# Patient Record
Sex: FEMALE | Race: BLACK OR AFRICAN AMERICAN | NOT HISPANIC OR LATINO | Employment: UNEMPLOYED | URBAN - METROPOLITAN AREA
[De-identification: names, ages, dates, MRNs, and addresses within clinical notes are randomized per-mention and may not be internally consistent; named-entity substitution may affect disease eponyms.]

---

## 2020-07-20 DIAGNOSIS — Z32.00 VISIT FOR CONFIRMATION OF PREGNANCY TEST RESULT WITH PHYSICAL EXAM: Primary | ICD-10-CM

## 2020-07-20 LAB — PROGEST SERPL-MCNC: 60.6 NG/ML

## 2020-07-20 PROCEDURE — 84702 CHORIONIC GONADOTROPIN TEST: CPT

## 2020-07-20 PROCEDURE — 84144 ASSAY OF PROGESTERONE: CPT

## 2020-07-20 PROCEDURE — 36415 COLL VENOUS BLD VENIPUNCTURE: CPT

## 2020-07-21 LAB — B-HCG SERPL-ACNC: 250 IU/L (ref 0–5)

## 2020-07-22 ENCOUNTER — MEDICAL CORRESPONDENCE (OUTPATIENT)
Dept: HEALTH INFORMATION MANAGEMENT | Facility: CLINIC | Age: 35
End: 2020-07-22

## 2020-07-22 DIAGNOSIS — Z32.00 POSSIBLE PREGNANCY, NOT YET CONFIRMED: ICD-10-CM

## 2020-07-22 DIAGNOSIS — Z32.00 POSSIBLE PREGNANCY, NOT YET CONFIRMED: Primary | ICD-10-CM

## 2020-07-22 LAB
B-HCG SERPL-ACNC: 349 IU/L (ref 0–5)
PROGEST SERPL-MCNC: 45.5 NG/ML

## 2020-07-22 PROCEDURE — 84702 CHORIONIC GONADOTROPIN TEST: CPT | Performed by: OBSTETRICS & GYNECOLOGY

## 2020-07-22 PROCEDURE — 84144 ASSAY OF PROGESTERONE: CPT | Performed by: OBSTETRICS & GYNECOLOGY

## 2020-07-22 PROCEDURE — 36415 COLL VENOUS BLD VENIPUNCTURE: CPT | Performed by: OBSTETRICS & GYNECOLOGY

## 2020-07-24 DIAGNOSIS — Z32.00 POSSIBLE PREGNANCY, NOT YET CONFIRMED: Primary | ICD-10-CM

## 2020-07-27 ENCOUNTER — TELEPHONE (OUTPATIENT)
Dept: OBGYN | Facility: CLINIC | Age: 35
End: 2020-07-27

## 2020-07-27 ENCOUNTER — MEDICAL CORRESPONDENCE (OUTPATIENT)
Dept: HEALTH INFORMATION MANAGEMENT | Facility: CLINIC | Age: 35
End: 2020-07-27

## 2020-07-27 DIAGNOSIS — Z32.00 POSSIBLE PREGNANCY, NOT YET CONFIRMED: ICD-10-CM

## 2020-07-27 LAB — B-HCG SERPL-ACNC: 700 IU/L (ref 0–5)

## 2020-07-27 PROCEDURE — 84702 CHORIONIC GONADOTROPIN TEST: CPT | Performed by: OBSTETRICS & GYNECOLOGY

## 2020-07-27 PROCEDURE — 36415 COLL VENOUS BLD VENIPUNCTURE: CPT | Performed by: OBSTETRICS & GYNECOLOGY

## 2020-07-27 NOTE — TELEPHONE ENCOUNTER
[facility] in [place] called regarding this patients lab results from her appointment today.   Augusta would like a call when the results are faxed over at [phone]

## 2020-07-27 NOTE — TELEPHONE ENCOUNTER
Today's HCG was faxed as requested  Pt informed  Darleen Betancourt RN on 7/27/2020 at 4:29 PM

## 2020-07-29 ENCOUNTER — HOSPITAL ENCOUNTER (OUTPATIENT)
Facility: CLINIC | Age: 35
Discharge: HOME | End: 2020-07-30
Attending: EMERGENCY MEDICINE | Admitting: OBSTETRICS & GYNECOLOGY
Payer: MEDICAID

## 2020-07-29 ENCOUNTER — ANESTHESIA EVENT (OUTPATIENT)
Dept: SURGERY | Facility: CLINIC | Age: 35
End: 2020-07-29
Payer: MEDICAID

## 2020-07-29 ENCOUNTER — ANESTHESIA (OUTPATIENT)
Dept: SURGERY | Facility: CLINIC | Age: 35
End: 2020-07-29
Payer: MEDICAID

## 2020-07-29 ENCOUNTER — APPOINTMENT (OUTPATIENT)
Dept: ULTRASOUND IMAGING | Facility: CLINIC | Age: 35
End: 2020-07-29
Attending: EMERGENCY MEDICINE
Payer: MEDICAID

## 2020-07-29 DIAGNOSIS — O00.90 ECTOPIC PREGNANCY: ICD-10-CM

## 2020-07-29 DIAGNOSIS — O00.101 RIGHT TUBAL PREGNANCY WITHOUT INTRAUTERINE PREGNANCY: Primary | ICD-10-CM

## 2020-07-29 DIAGNOSIS — Z32.00 POSSIBLE PREGNANCY, NOT YET CONFIRMED: ICD-10-CM

## 2020-07-29 LAB
ABO + RH BLD: NORMAL
ABO + RH BLD: NORMAL
ALBUMIN UR-MCNC: 10 MG/DL
ANION GAP SERPL CALCULATED.3IONS-SCNC: 5 MMOL/L (ref 3–14)
APPEARANCE UR: CLEAR
B-HCG SERPL-ACNC: 876 IU/L (ref 0–5)
B-HCG SERPL-ACNC: 899 IU/L (ref 0–5)
BASOPHILS # BLD AUTO: 0 10E9/L (ref 0–0.2)
BASOPHILS NFR BLD AUTO: 0.4 %
BILIRUB UR QL STRIP: NEGATIVE
BLD GP AB SCN SERPL QL: NORMAL
BLOOD BANK CMNT PATIENT-IMP: NORMAL
BUN SERPL-MCNC: 12 MG/DL (ref 7–30)
CALCIUM SERPL-MCNC: 8.3 MG/DL (ref 8.5–10.1)
CHLORIDE SERPL-SCNC: 105 MMOL/L (ref 94–109)
CO2 SERPL-SCNC: 26 MMOL/L (ref 20–32)
COLOR UR AUTO: YELLOW
CREAT SERPL-MCNC: 0.88 MG/DL (ref 0.52–1.04)
DIFFERENTIAL METHOD BLD: ABNORMAL
EOSINOPHIL # BLD AUTO: 0.5 10E9/L (ref 0–0.7)
EOSINOPHIL NFR BLD AUTO: 4.9 %
ERYTHROCYTE [DISTWIDTH] IN BLOOD BY AUTOMATED COUNT: 13.1 % (ref 10–15)
GFR SERPL CREATININE-BSD FRML MDRD: 85 ML/MIN/{1.73_M2}
GLUCOSE SERPL-MCNC: 101 MG/DL (ref 70–99)
GLUCOSE UR STRIP-MCNC: NEGATIVE MG/DL
HCT VFR BLD AUTO: 34.1 % (ref 35–47)
HGB BLD-MCNC: 11.4 G/DL (ref 11.7–15.7)
HGB UR QL STRIP: ABNORMAL
IMM GRANULOCYTES # BLD: 0 10E9/L (ref 0–0.4)
IMM GRANULOCYTES NFR BLD: 0.2 %
KETONES UR STRIP-MCNC: NEGATIVE MG/DL
LEUKOCYTE ESTERASE UR QL STRIP: ABNORMAL
LYMPHOCYTES # BLD AUTO: 3.6 10E9/L (ref 0.8–5.3)
LYMPHOCYTES NFR BLD AUTO: 35.7 %
MCH RBC QN AUTO: 28.1 PG (ref 26.5–33)
MCHC RBC AUTO-ENTMCNC: 33.4 G/DL (ref 31.5–36.5)
MCV RBC AUTO: 84 FL (ref 78–100)
MONOCYTES # BLD AUTO: 0.9 10E9/L (ref 0–1.3)
MONOCYTES NFR BLD AUTO: 8.8 %
MUCOUS THREADS #/AREA URNS LPF: PRESENT /LPF
NEUTROPHILS # BLD AUTO: 5.1 10E9/L (ref 1.6–8.3)
NEUTROPHILS NFR BLD AUTO: 50 %
NITRATE UR QL: NEGATIVE
NRBC # BLD AUTO: 0 10*3/UL
NRBC BLD AUTO-RTO: 0 /100
PH UR STRIP: 5.5 PH (ref 5–7)
PLATELET # BLD AUTO: 458 10E9/L (ref 150–450)
POTASSIUM SERPL-SCNC: 3.4 MMOL/L (ref 3.4–5.3)
RADIOLOGIST FLAGS: ABNORMAL
RBC # BLD AUTO: 4.06 10E12/L (ref 3.8–5.2)
RBC #/AREA URNS AUTO: 24 /HPF (ref 0–2)
SODIUM SERPL-SCNC: 136 MMOL/L (ref 133–144)
SOURCE: ABNORMAL
SP GR UR STRIP: 1.02 (ref 1–1.03)
SPECIMEN EXP DATE BLD: NORMAL
SQUAMOUS #/AREA URNS AUTO: 2 /HPF (ref 0–1)
UROBILINOGEN UR STRIP-MCNC: NORMAL MG/DL (ref 0–2)
WBC # BLD AUTO: 10.1 10E9/L (ref 4–11)
WBC #/AREA URNS AUTO: 8 /HPF (ref 0–5)

## 2020-07-29 PROCEDURE — 25000125 ZZHC RX 250: Performed by: NURSE ANESTHETIST, CERTIFIED REGISTERED

## 2020-07-29 PROCEDURE — 96360 HYDRATION IV INFUSION INIT: CPT

## 2020-07-29 PROCEDURE — 86901 BLOOD TYPING SEROLOGIC RH(D): CPT | Performed by: EMERGENCY MEDICINE

## 2020-07-29 PROCEDURE — 88305 TISSUE EXAM BY PATHOLOGIST: CPT | Performed by: OBSTETRICS & GYNECOLOGY

## 2020-07-29 PROCEDURE — 99285 EMERGENCY DEPT VISIT HI MDM: CPT | Mod: 25

## 2020-07-29 PROCEDURE — 85025 COMPLETE CBC W/AUTO DIFF WBC: CPT | Performed by: EMERGENCY MEDICINE

## 2020-07-29 PROCEDURE — 71000012 ZZH RECOVERY PHASE 1 LEVEL 1 FIRST HR: Performed by: OBSTETRICS & GYNECOLOGY

## 2020-07-29 PROCEDURE — 27210794 ZZH OR GENERAL SUPPLY STERILE: Performed by: OBSTETRICS & GYNECOLOGY

## 2020-07-29 PROCEDURE — 81001 URINALYSIS AUTO W/SCOPE: CPT | Performed by: EMERGENCY MEDICINE

## 2020-07-29 PROCEDURE — 76801 OB US < 14 WKS SINGLE FETUS: CPT

## 2020-07-29 PROCEDURE — 25000125 ZZHC RX 250: Performed by: OBSTETRICS & GYNECOLOGY

## 2020-07-29 PROCEDURE — 88305 TISSUE EXAM BY PATHOLOGIST: CPT | Mod: 26 | Performed by: OBSTETRICS & GYNECOLOGY

## 2020-07-29 PROCEDURE — 25800030 ZZH RX IP 258 OP 636: Performed by: ANESTHESIOLOGY

## 2020-07-29 PROCEDURE — 40000170 ZZH STATISTIC PRE-PROCEDURE ASSESSMENT II: Performed by: OBSTETRICS & GYNECOLOGY

## 2020-07-29 PROCEDURE — 37000008 ZZH ANESTHESIA TECHNICAL FEE, 1ST 30 MIN: Performed by: OBSTETRICS & GYNECOLOGY

## 2020-07-29 PROCEDURE — 25000566 ZZH SEVOFLURANE, EA 15 MIN: Performed by: OBSTETRICS & GYNECOLOGY

## 2020-07-29 PROCEDURE — 25800030 ZZH RX IP 258 OP 636: Performed by: NURSE ANESTHETIST, CERTIFIED REGISTERED

## 2020-07-29 PROCEDURE — 86900 BLOOD TYPING SEROLOGIC ABO: CPT | Performed by: EMERGENCY MEDICINE

## 2020-07-29 PROCEDURE — 36000058 ZZH SURGERY LEVEL 3 EA 15 ADDTL MIN: Performed by: OBSTETRICS & GYNECOLOGY

## 2020-07-29 PROCEDURE — 25800025 ZZH RX 258: Performed by: OBSTETRICS & GYNECOLOGY

## 2020-07-29 PROCEDURE — 25000128 H RX IP 250 OP 636: Performed by: OBSTETRICS & GYNECOLOGY

## 2020-07-29 PROCEDURE — 36415 COLL VENOUS BLD VENIPUNCTURE: CPT | Performed by: OBSTETRICS & GYNECOLOGY

## 2020-07-29 PROCEDURE — 86850 RBC ANTIBODY SCREEN: CPT | Performed by: EMERGENCY MEDICINE

## 2020-07-29 PROCEDURE — 84702 CHORIONIC GONADOTROPIN TEST: CPT | Performed by: OBSTETRICS & GYNECOLOGY

## 2020-07-29 PROCEDURE — 25000128 H RX IP 250 OP 636: Performed by: NURSE ANESTHETIST, CERTIFIED REGISTERED

## 2020-07-29 PROCEDURE — 80048 BASIC METABOLIC PNL TOTAL CA: CPT | Performed by: EMERGENCY MEDICINE

## 2020-07-29 PROCEDURE — 84702 CHORIONIC GONADOTROPIN TEST: CPT | Performed by: EMERGENCY MEDICINE

## 2020-07-29 PROCEDURE — 25800030 ZZH RX IP 258 OP 636: Performed by: EMERGENCY MEDICINE

## 2020-07-29 PROCEDURE — 76815 OB US LIMITED FETUS(S): CPT

## 2020-07-29 PROCEDURE — 37000009 ZZH ANESTHESIA TECHNICAL FEE, EACH ADDTL 15 MIN: Performed by: OBSTETRICS & GYNECOLOGY

## 2020-07-29 PROCEDURE — 36000056 ZZH SURGERY LEVEL 3 1ST 30 MIN: Performed by: OBSTETRICS & GYNECOLOGY

## 2020-07-29 RX ORDER — GLYCOPYRROLATE 0.2 MG/ML
INJECTION, SOLUTION INTRAMUSCULAR; INTRAVENOUS PRN
Status: DISCONTINUED | OUTPATIENT
Start: 2020-07-29 | End: 2020-07-30

## 2020-07-29 RX ORDER — PROPOFOL 10 MG/ML
INJECTION, EMULSION INTRAVENOUS PRN
Status: DISCONTINUED | OUTPATIENT
Start: 2020-07-29 | End: 2020-07-30

## 2020-07-29 RX ORDER — ONDANSETRON 2 MG/ML
4 INJECTION INTRAMUSCULAR; INTRAVENOUS EVERY 30 MIN PRN
Status: DISCONTINUED | OUTPATIENT
Start: 2020-07-29 | End: 2020-07-30 | Stop reason: HOSPADM

## 2020-07-29 RX ORDER — HYDROMORPHONE HYDROCHLORIDE 1 MG/ML
.3-.5 INJECTION, SOLUTION INTRAMUSCULAR; INTRAVENOUS; SUBCUTANEOUS EVERY 10 MIN PRN
Status: DISCONTINUED | OUTPATIENT
Start: 2020-07-29 | End: 2020-07-30

## 2020-07-29 RX ORDER — ACETAMINOPHEN 650 MG/1
650 SUPPOSITORY RECTAL EVERY 4 HOURS PRN
Status: DISCONTINUED | OUTPATIENT
Start: 2020-07-29 | End: 2020-07-30 | Stop reason: HOSPADM

## 2020-07-29 RX ORDER — NEOSTIGMINE METHYLSULFATE 1 MG/ML
VIAL (ML) INJECTION PRN
Status: DISCONTINUED | OUTPATIENT
Start: 2020-07-29 | End: 2020-07-30

## 2020-07-29 RX ORDER — NALOXONE HYDROCHLORIDE 0.4 MG/ML
.1-.4 INJECTION, SOLUTION INTRAMUSCULAR; INTRAVENOUS; SUBCUTANEOUS
Status: DISCONTINUED | OUTPATIENT
Start: 2020-07-29 | End: 2020-07-30 | Stop reason: HOSPADM

## 2020-07-29 RX ORDER — FENTANYL CITRATE 50 UG/ML
25-50 INJECTION, SOLUTION INTRAMUSCULAR; INTRAVENOUS
Status: DISCONTINUED | OUTPATIENT
Start: 2020-07-29 | End: 2020-07-30 | Stop reason: HOSPADM

## 2020-07-29 RX ORDER — MAGNESIUM HYDROXIDE 1200 MG/15ML
LIQUID ORAL PRN
Status: DISCONTINUED | OUTPATIENT
Start: 2020-07-29 | End: 2020-07-30 | Stop reason: HOSPADM

## 2020-07-29 RX ORDER — DEXAMETHASONE SODIUM PHOSPHATE 4 MG/ML
INJECTION, SOLUTION INTRA-ARTICULAR; INTRALESIONAL; INTRAMUSCULAR; INTRAVENOUS; SOFT TISSUE PRN
Status: DISCONTINUED | OUTPATIENT
Start: 2020-07-29 | End: 2020-07-30

## 2020-07-29 RX ORDER — FENTANYL CITRATE 50 UG/ML
INJECTION, SOLUTION INTRAMUSCULAR; INTRAVENOUS PRN
Status: DISCONTINUED | OUTPATIENT
Start: 2020-07-29 | End: 2020-07-30

## 2020-07-29 RX ORDER — ONDANSETRON 2 MG/ML
INJECTION INTRAMUSCULAR; INTRAVENOUS PRN
Status: DISCONTINUED | OUTPATIENT
Start: 2020-07-29 | End: 2020-07-30

## 2020-07-29 RX ORDER — SODIUM CHLORIDE, SODIUM LACTATE, POTASSIUM CHLORIDE, CALCIUM CHLORIDE 600; 310; 30; 20 MG/100ML; MG/100ML; MG/100ML; MG/100ML
INJECTION, SOLUTION INTRAVENOUS CONTINUOUS
Status: DISCONTINUED | OUTPATIENT
Start: 2020-07-30 | End: 2020-07-30 | Stop reason: HOSPADM

## 2020-07-29 RX ORDER — LIDOCAINE HYDROCHLORIDE 20 MG/ML
INJECTION, SOLUTION INFILTRATION; PERINEURAL PRN
Status: DISCONTINUED | OUTPATIENT
Start: 2020-07-29 | End: 2020-07-30

## 2020-07-29 RX ORDER — SODIUM CHLORIDE, SODIUM LACTATE, POTASSIUM CHLORIDE, CALCIUM CHLORIDE 600; 310; 30; 20 MG/100ML; MG/100ML; MG/100ML; MG/100ML
INJECTION, SOLUTION INTRAVENOUS CONTINUOUS
Status: DISCONTINUED | OUTPATIENT
Start: 2020-07-29 | End: 2020-07-30 | Stop reason: HOSPADM

## 2020-07-29 RX ORDER — KETOROLAC TROMETHAMINE 30 MG/ML
INJECTION, SOLUTION INTRAMUSCULAR; INTRAVENOUS PRN
Status: DISCONTINUED | OUTPATIENT
Start: 2020-07-29 | End: 2020-07-30

## 2020-07-29 RX ORDER — ONDANSETRON 4 MG/1
4 TABLET, ORALLY DISINTEGRATING ORAL EVERY 30 MIN PRN
Status: DISCONTINUED | OUTPATIENT
Start: 2020-07-29 | End: 2020-07-30 | Stop reason: HOSPADM

## 2020-07-29 RX ORDER — FENTANYL CITRATE 50 UG/ML
25-100 INJECTION, SOLUTION INTRAMUSCULAR; INTRAVENOUS
Status: DISCONTINUED | OUTPATIENT
Start: 2020-07-29 | End: 2020-07-30 | Stop reason: HOSPADM

## 2020-07-29 RX ORDER — BUPIVACAINE HYDROCHLORIDE AND EPINEPHRINE 2.5; 5 MG/ML; UG/ML
INJECTION, SOLUTION INFILTRATION; PERINEURAL PRN
Status: DISCONTINUED | OUTPATIENT
Start: 2020-07-29 | End: 2020-07-30 | Stop reason: HOSPADM

## 2020-07-29 RX ORDER — CEFAZOLIN SODIUM 2 G/100ML
2 INJECTION, SOLUTION INTRAVENOUS ONCE
Status: COMPLETED | OUTPATIENT
Start: 2020-07-29 | End: 2020-07-29

## 2020-07-29 RX ADMIN — NEOSTIGMINE METHYLSULFATE 3 MG: 1 INJECTION, SOLUTION INTRAVENOUS at 23:34

## 2020-07-29 RX ADMIN — CEFAZOLIN SODIUM 2 G: 2 INJECTION, SOLUTION INTRAVENOUS at 22:20

## 2020-07-29 RX ADMIN — ROCURONIUM BROMIDE 20 MG: 10 INJECTION INTRAVENOUS at 22:31

## 2020-07-29 RX ADMIN — FENTANYL CITRATE 50 MCG: 50 INJECTION, SOLUTION INTRAMUSCULAR; INTRAVENOUS at 22:12

## 2020-07-29 RX ADMIN — FENTANYL CITRATE 50 MCG: 50 INJECTION, SOLUTION INTRAMUSCULAR; INTRAVENOUS at 22:32

## 2020-07-29 RX ADMIN — HYDROMORPHONE HYDROCHLORIDE 0.5 MG: 1 INJECTION, SOLUTION INTRAMUSCULAR; INTRAVENOUS; SUBCUTANEOUS at 23:14

## 2020-07-29 RX ADMIN — SODIUM CHLORIDE 1000 ML: 9 INJECTION, SOLUTION INTRAVENOUS at 18:41

## 2020-07-29 RX ADMIN — GLYCOPYRROLATE 0.4 MG: 0.2 INJECTION, SOLUTION INTRAMUSCULAR; INTRAVENOUS at 23:34

## 2020-07-29 RX ADMIN — MIDAZOLAM 2 MG: 1 INJECTION INTRAMUSCULAR; INTRAVENOUS at 22:08

## 2020-07-29 RX ADMIN — PHENYLEPHRINE HYDROCHLORIDE 100 MCG: 10 INJECTION INTRAVENOUS at 22:28

## 2020-07-29 RX ADMIN — LIDOCAINE HYDROCHLORIDE 60 MG: 20 INJECTION, SOLUTION INFILTRATION; PERINEURAL at 22:12

## 2020-07-29 RX ADMIN — PROPOFOL 150 MG: 10 INJECTION, EMULSION INTRAVENOUS at 22:12

## 2020-07-29 RX ADMIN — SODIUM CHLORIDE, POTASSIUM CHLORIDE, SODIUM LACTATE AND CALCIUM CHLORIDE: 600; 310; 30; 20 INJECTION, SOLUTION INTRAVENOUS at 22:05

## 2020-07-29 RX ADMIN — ONDANSETRON 4 MG: 2 INJECTION INTRAMUSCULAR; INTRAVENOUS at 22:21

## 2020-07-29 RX ADMIN — SUCCINYLCHOLINE CHLORIDE 60 MG: 20 INJECTION, SOLUTION INTRAMUSCULAR; INTRAVENOUS; PARENTERAL at 22:12

## 2020-07-29 RX ADMIN — KETOROLAC TROMETHAMINE 30 MG: 30 INJECTION, SOLUTION INTRAMUSCULAR at 23:34

## 2020-07-29 RX ADMIN — DEXAMETHASONE SODIUM PHOSPHATE 4 MG: 4 INJECTION, SOLUTION INTRA-ARTICULAR; INTRALESIONAL; INTRAMUSCULAR; INTRAVENOUS; SOFT TISSUE at 22:21

## 2020-07-29 ASSESSMENT — ENCOUNTER SYMPTOMS
BACK PAIN: 0
FREQUENCY: 0
VOMITING: 0
ABDOMINAL PAIN: 1

## 2020-07-29 NOTE — ED PROVIDER NOTES
History     Chief Complaint:  Abdominal Pain      HPI   Sherry George is a 34 year  female with an IVF implant procedure on  who presents with abdominal pain. The patient reports that she is having lower abdominal pain that is near the belly button and also has vaginal pain that started on Saturday and is described as crampy. She notes that she has had one day of heavy spotting. She currently takes prenatal vitamins, but recently discontinues her Estradial last week. She reports that her HCG continues to rise after discontinuing her progesterone shots last week. The last time her HCG was checked her HCG level was 700. Her last ultrasound was on 9 days ago and a pregnancy was not seen. She denies frequency, vomiting, or back pain. Of note, she recently moved from [place] and has been in contact with her obstetrician there, but has not met her new obstetrician.    Allergies:  No Known Drug Allergies    Medications:    Prenatal vitamins    Past Medical History:    Medical history reviewed. No pertinent medical history.    Past Surgical History:    Surgical history reviewed. No pertinent surgical history.    Family History:    Family history reviewed. No pertinent family history.     Social History:  The patient was accompanied to the ED by her mother.  Smoking Status: Never Smoker  Smokeless Tobacco: Never Used  Alcohol Use: Not currently  Drug Use: Negative  PCP: Jose De Jesus [facility]  Marital Status:        Review of Systems   Gastrointestinal: Positive for abdominal pain. Negative for vomiting.   Genitourinary: Positive for vaginal bleeding and vaginal pain. Negative for frequency.   Musculoskeletal: Negative for back pain.   All other systems reviewed and are negative.      Physical Exam     Patient Vitals for the past 24 hrs:   BP Temp Temp src Pulse Resp SpO2   20 2053 112/75 -- -- 92 -- 100 %   20 1811 130/84 97.6  F (36.4  C) Temporal 95 18 100 %      Physical Exam  Gen: Pleasant, masked, accompanied by mother.    Eye:   Pupils are equal, round, and reactive.     Sclera non-injected.    Cardiac:     Normal rate and regular rhythm.    No murmurs, gallops, or rubs.    Pulmonary:     Clear to auscultation bilaterally.    No wheezes, rales, or rhonchi.    Abdomen:     Abdomen distended and diffusely tender to palpation.  Voluntary guarding throughout.    Musculoskeletal:     Normal movement of all extremities without evidence for deficit.    Extremities:    No edema.    Skin:   Warm and dry.    Neurologic:    Non-focal exam without asymmetric weakness or numbness.    Normal tone    Psychiatric:     Normal affect with appropriate interaction with examiner.    Emergency Department Course   Imaging:  Radiology findings were communicated with the patient who voiced understanding of the findings.     US OB <14 Weeks W Transvaginal  Abnormal  1. No intrauterine pregnancy is identified.  2. An ill-defined heterogeneous masslike structure in the right  adnexal region measures 5.6 cm, and should be considered highly  suspicious for an ectopic pregnancy.  3. A 4 cm complex cystic lesion in the left ovary has an appearance  suggestive of a hemorrhagic cyst. Follow-up ultrasound in 6-12 weeks  may be helpful to ensure resolution.   Reading per radiology     [Critical Result: Possible ectopic pregnancy on the right.    Finding was identified on 7/29/2020 8:41 PM.     Dr. Daly was contacted by me on 7/29/2020 8:45 PM and verbalized  understanding of the critical result.     POC US OB TRANSABDOMINAL LIMITED  PERFORMED BY: Dr. Daly  BODY AREA IMAGED: Transabdominal uterine ultrasound  INDICATIONS: Evaluate 1st trimester pregnancy  FINDINGS: No free fluid in the RUQ/Salas's pouch.  No findings of IUP including gestational sac.  Images saved to PACS.     Laboratory:  Laboratory findings were communicated with the patient who voiced understanding of the findings.    CBC: WBC  10.1, HGB 11.4 (L),  (H)  BMP: glucose 101 (H), calcium 8,3 (L) o/w WNL (Creatinine 0.88)    UA with micro: urine blood large (!), protein albumin urine 10 (!), leukocyte esterase urine small (!), WBC/HPF 8 (H), RBC/HPF 24 (H), squamous epithelial/HPF urine 2 (H), mucous urine present (!)  o/w negative  HCG Quantitative: 876 (H)  ABO/Rh type and screen: A negative, antibody screen negative    Interventions:  1841 0.9% NS 1000 mL IV     Emergency Department Course:  1826 Nursing notes and vitals reviewed. I performed an exam of the patient as documented above.     1834 The patient was sent for a US while in the emergency department, results above.     1842 The patient provided a urine sample here in the emergency department. This was sent for laboratory testing, findings above.    1842 IV was inserted and blood was drawn for laboratory testing, results above.     1927 D/w Dr. Simental, on call for [facility].      1950  The patient received a US OB <14 Weeks W Transvaginal while in the emergency department, results above.      Prior to admit, I personally reviewed the results with the patient and all related questions were answered. The patient verbalized understanding and is amenable to plan.     Findings and plan explained to the Patient who consents to admission. Discussed the patient with Dr. Simental, who will take the patient to the OR for surgical management of ectopic pregnancy.    Impression & Plan    Medical Decision Making:  Sherry George is a 34 year old female otherwise healthy who presents today with abdominal pain, vaginal bleeding, nexium pregnancy. There is high concern for ectopic pregnancy given that she had IVF, followed by several weeks of abnormally rising HCG levels. Initial laboratory results demonstrate mild anemia, and HCG that is 876, slightly decreased from earlier check of 899 today. Electrolytes were normal. Urinalysis shows microscopic hematuria, but is not convincing for  infection. Bedside ultrasound did not demonstrate any findings of free fluid, those also negative for intrauterine pregnancy. Transvaginal ultrasound demonstrates a 5.6 cm mass in the right adnexa with blood flow, concerning for ectopic pregnancy. Unfortunately, the are ectopic appears too large to be safely managed medically with methotrexate. The patient and her mother understand. These findings were reviewed with Dr. Simental who is on-call of OB. She plans to take the patient to the OR emergently for a salpingectomy. The patient was updates and agrees with this plan. She appears comfortable and she has been hemodynamically stable in the ED.      Diagnosis:    ICD-10-CM    1. Right tubal pregnancy without intrauterine pregnancy  O00.101    2. Ectopic pregnancy  O00.90 Laparoscopic evacuation ectopic pregnancy     Laparoscopic evacuation ectopic pregnancy     Salpingectomy     Salpingectomy     Disposition:   The patient is admitted into the care of Dr. Simental.    Scribe Disclosure:  Netta VAZQUEZ, am serving as a scribe at 6:17 PM on 7/29/2020 to document services personally performed by Jennifer Daly MD based on my observations and the provider's statements to me.       Jennifer Daly MD  07/30/20 0007

## 2020-07-29 NOTE — ED TRIAGE NOTES
"IVF implant on 7/1, positive pregnancy test on 7/10, having some low abdominal pain \"near belly button\" and after discontinuing progesterone shots, HCG continuing to rise. PCP would like her to get an ultrasound for possible ectopic  "

## 2020-07-29 NOTE — ED NOTES
Bed: ED20  Expected date:   Expected time:   Means of arrival:   Comments:  Triage possible ectopic

## 2020-07-30 VITALS
RESPIRATION RATE: 12 BRPM | TEMPERATURE: 98.3 F | BODY MASS INDEX: 21.32 KG/M2 | OXYGEN SATURATION: 100 % | SYSTOLIC BLOOD PRESSURE: 112 MMHG | HEART RATE: 72 BPM | HEIGHT: 64 IN | WEIGHT: 124.9 LBS | DIASTOLIC BLOOD PRESSURE: 73 MMHG

## 2020-07-30 PROBLEM — O00.109 ECTOPIC PREGNANCY, TUBAL: Status: ACTIVE | Noted: 2020-07-30

## 2020-07-30 LAB
B-HCG SERPL-ACNC: 471 IU/L (ref 0–5)
BLOOD BANK CMNT PATIENT-IMP: NORMAL
GLUCOSE BLDC GLUCOMTR-MCNC: 113 MG/DL (ref 70–99)
HGB BLD-MCNC: 11.3 G/DL (ref 11.7–15.7)

## 2020-07-30 PROCEDURE — 25000132 ZZH RX MED GY IP 250 OP 250 PS 637: Performed by: OBSTETRICS & GYNECOLOGY

## 2020-07-30 PROCEDURE — 25000128 H RX IP 250 OP 636: Performed by: OBSTETRICS & GYNECOLOGY

## 2020-07-30 PROCEDURE — 86900 BLOOD TYPING SEROLOGIC ABO: CPT | Performed by: OBSTETRICS & GYNECOLOGY

## 2020-07-30 PROCEDURE — 36415 COLL VENOUS BLD VENIPUNCTURE: CPT | Performed by: OBSTETRICS & GYNECOLOGY

## 2020-07-30 PROCEDURE — 84702 CHORIONIC GONADOTROPIN TEST: CPT | Performed by: OBSTETRICS & GYNECOLOGY

## 2020-07-30 PROCEDURE — 85018 HEMOGLOBIN: CPT | Performed by: OBSTETRICS & GYNECOLOGY

## 2020-07-30 PROCEDURE — 58661 LAPAROSCOPY REMOVE ADNEXA: CPT | Mod: 51 | Performed by: OBSTETRICS & GYNECOLOGY

## 2020-07-30 PROCEDURE — 58662 LAPAROSCOPY EXCISE LESIONS: CPT | Performed by: OBSTETRICS & GYNECOLOGY

## 2020-07-30 PROCEDURE — 82962 GLUCOSE BLOOD TEST: CPT

## 2020-07-30 PROCEDURE — 58661 LAPAROSCOPY REMOVE ADNEXA: CPT | Mod: 80 | Performed by: OBSTETRICS & GYNECOLOGY

## 2020-07-30 PROCEDURE — 58662 LAPAROSCOPY EXCISE LESIONS: CPT | Mod: 80 | Performed by: OBSTETRICS & GYNECOLOGY

## 2020-07-30 PROCEDURE — 85461 HEMOGLOBIN FETAL: CPT | Performed by: OBSTETRICS & GYNECOLOGY

## 2020-07-30 PROCEDURE — 86901 BLOOD TYPING SEROLOGIC RH(D): CPT | Performed by: OBSTETRICS & GYNECOLOGY

## 2020-07-30 PROCEDURE — 25800030 ZZH RX IP 258 OP 636: Performed by: OBSTETRICS & GYNECOLOGY

## 2020-07-30 RX ORDER — SODIUM CHLORIDE, SODIUM LACTATE, POTASSIUM CHLORIDE, CALCIUM CHLORIDE 600; 310; 30; 20 MG/100ML; MG/100ML; MG/100ML; MG/100ML
INJECTION, SOLUTION INTRAVENOUS CONTINUOUS
Status: DISCONTINUED | OUTPATIENT
Start: 2020-07-30 | End: 2020-07-30 | Stop reason: HOSPADM

## 2020-07-30 RX ORDER — HYDROMORPHONE HYDROCHLORIDE 1 MG/ML
0.2 INJECTION, SOLUTION INTRAMUSCULAR; INTRAVENOUS; SUBCUTANEOUS
Status: DISCONTINUED | OUTPATIENT
Start: 2020-07-30 | End: 2020-07-30 | Stop reason: HOSPADM

## 2020-07-30 RX ORDER — OXYCODONE HYDROCHLORIDE 5 MG/1
5-10 TABLET ORAL
Status: DISCONTINUED | OUTPATIENT
Start: 2020-07-30 | End: 2020-07-30 | Stop reason: HOSPADM

## 2020-07-30 RX ORDER — LIDOCAINE 40 MG/G
CREAM TOPICAL
Status: DISCONTINUED | OUTPATIENT
Start: 2020-07-29 | End: 2020-07-30 | Stop reason: HOSPADM

## 2020-07-30 RX ORDER — ONDANSETRON 4 MG/1
4 TABLET, ORALLY DISINTEGRATING ORAL EVERY 6 HOURS PRN
Status: DISCONTINUED | OUTPATIENT
Start: 2020-07-30 | End: 2020-07-30 | Stop reason: HOSPADM

## 2020-07-30 RX ORDER — OXYCODONE HYDROCHLORIDE 5 MG/1
5 TABLET ORAL EVERY 6 HOURS PRN
Qty: 20 TABLET | Refills: 0 | Status: SHIPPED | OUTPATIENT
Start: 2020-07-30 | End: 2020-08-11

## 2020-07-30 RX ORDER — ONDANSETRON 2 MG/ML
4 INJECTION INTRAMUSCULAR; INTRAVENOUS EVERY 6 HOURS PRN
Status: DISCONTINUED | OUTPATIENT
Start: 2020-07-30 | End: 2020-07-30 | Stop reason: HOSPADM

## 2020-07-30 RX ORDER — OXYCODONE AND ACETAMINOPHEN 5; 325 MG/1; MG/1
1-2 TABLET ORAL EVERY 4 HOURS PRN
Qty: 12 TABLET | Refills: 0 | Status: SHIPPED | OUTPATIENT
Start: 2020-07-30 | End: 2020-07-30

## 2020-07-30 RX ORDER — ACETAMINOPHEN 325 MG/1
650 TABLET ORAL EVERY 6 HOURS PRN
Status: DISCONTINUED | OUTPATIENT
Start: 2020-07-30 | End: 2020-07-30 | Stop reason: HOSPADM

## 2020-07-30 RX ORDER — NALOXONE HYDROCHLORIDE 0.4 MG/ML
.1-.4 INJECTION, SOLUTION INTRAMUSCULAR; INTRAVENOUS; SUBCUTANEOUS
Status: DISCONTINUED | OUTPATIENT
Start: 2020-07-29 | End: 2020-07-30 | Stop reason: HOSPADM

## 2020-07-30 RX ADMIN — SODIUM CHLORIDE, POTASSIUM CHLORIDE, SODIUM LACTATE AND CALCIUM CHLORIDE: 600; 310; 30; 20 INJECTION, SOLUTION INTRAVENOUS at 01:41

## 2020-07-30 RX ADMIN — HUMAN RHO(D) IMMUNE GLOBULIN 300 MCG: 300 INJECTION, SOLUTION INTRAMUSCULAR at 00:44

## 2020-07-30 RX ADMIN — SODIUM CHLORIDE, POTASSIUM CHLORIDE, SODIUM LACTATE AND CALCIUM CHLORIDE: 600; 310; 30; 20 INJECTION, SOLUTION INTRAVENOUS at 06:26

## 2020-07-30 RX ADMIN — ACETAMINOPHEN 650 MG: 325 TABLET, FILM COATED ORAL at 06:49

## 2020-07-30 ASSESSMENT — ACTIVITIES OF DAILY LIVING (ADL)
RETIRED_EATING: 0-->INDEPENDENT
BATHING: 0-->INDEPENDENT
RETIRED_COMMUNICATION: 0-->UNDERSTANDS/COMMUNICATES WITHOUT DIFFICULTY
TRANSFERRING: 0-->INDEPENDENT
DRESS: 0-->INDEPENDENT
COGNITION: 0 - NO COGNITION ISSUES REPORTED
SWALLOWING: 0-->SWALLOWS FOODS/LIQUIDS WITHOUT DIFFICULTY
AMBULATION: 0-->INDEPENDENT
TOILETING: 0-->INDEPENDENT
FALL_HISTORY_WITHIN_LAST_SIX_MONTHS: NO

## 2020-07-30 ASSESSMENT — MIFFLIN-ST. JEOR: SCORE: 1246.54

## 2020-07-30 NOTE — DISCHARGE INSTRUCTIONS
Same Day Surgery Discharge Instructions for  Sedation and General Anesthesia       It's not unusual to feel dizzy, light-headed or faint for up to 24 hours after surgery or while taking pain medication.  If you have these symptoms: sit for a few minutes before standing and have someone assist you when you get up to walk or use the bathroom.      You should rest and relax for the next 24 hours. We recommend you make arrangements to have an adult stay with you for at least 24 hours after your discharge.  Avoid hazardous and strenuous activity.      DO NOT DRIVE any vehicle or operate mechanical equipment for 24 hours following the end of your surgery.  Even though you may feel normal, your reactions may be affected by the medication you have received.      Do not drink alcoholic beverages for 24 hours following surgery.       Slowly progress to your regular diet as you feel able. It's not unusual to feel nauseated and/or vomit after receiving anesthesia.  If you develop these symptoms, drink clear liquids (apple juice, ginger ale, broth, 7-up, etc. ) until you feel better.  If your nausea and vomiting persists for 24 hours, please notify your surgeon.        All narcotic pain medications, along with inactivity and anesthesia, can cause constipation. Drinking plenty of liquids and increasing fiber intake will help.      For any questions of a medical nature, call your surgeon.      Do not make important decisions for 24 hours.      If you had general anesthesia, you may have a sore throat for a couple of days related to the breathing tube used during surgery.  You may use Cepacol lozenges to help with this discomfort.  If it worsens or if you develop a fever, contact your surgeon.       If you feel your pain is not well managed with the pain medications prescribed by your surgeon, please contact your surgeon's office to let them know so they can address your concerns.       CoVid 19 Information    We want to give you  information regarding Covid. Please consult your primary care provider with any questions you might have.     Patient who have symptoms (cough, fever, or shortness of breath), need to isolate for 7 days from when symptoms started OR 72 hours after fever resolves (without fever reducing medications) AND improvement of respiratory symptoms (whichever is longer).      Isolate yourself at home (in own room/own bathroom if possible)    Do Not allow any visitors    Do Not go to work or school    Do Not go to Religion,  centers, shopping, or other public places.    Do Not shake hands.    Avoid close and intimate contact with others (hugging, kissing).    Follow CDC recommendations for household cleaning of frequently touched services.     After the initial 7 days, continue to isolate yourself from household members as much as possible. To continue decrease the risk of community spread and exposure, you and any members of your household should limit activities in public for 14 days after starting home isolation.     You can reference the following CDC link for helpful home isolation/care tips:  https://www.cdc.gov/coronavirus/2019-ncov/downloads/10Things.pdf    Protect Others:    Cover Your Mouth and Nose with a mask, disposable tissue or wash cloth to avoid spreading germs to others.    Wash your hands and face frequently with soap and water    Call Your Primary Doctor If: Breathing difficulty develops or you become worse.    For more information about COVID19 and options for caring for yourself at home, please visit the CDC website at https://www.cdc.gov/coronavirus/2019-ncov/about/steps-when-sick.html  For more options for care at [facility], please visit our website at [email or web address]    **If you have questions or concerns about your procedure,   call Dr. Magana at [phone]**

## 2020-07-30 NOTE — ANESTHESIA PREPROCEDURE EVALUATION
Anesthesia Pre-Procedure Evaluation    Patient: Sherry George   MRN: [id number] : 1985          Preoperative Diagnosis: Ectopic pregnancy [O00.90]    Procedure(s):  PELVISCOPY, POSSIBLE RIGHT SALPINGECTOMY  pelviscopy, possible right salpingectomy    History reviewed. No pertinent past medical history.  History reviewed. No pertinent surgical history.    Anesthesia Evaluation     . Pt has had prior anesthetic.     No history of anesthetic complications          ROS/MED HX    ENT/Pulmonary:      (-) sleep apnea   Neurologic:       Cardiovascular:         METS/Exercise Tolerance:     Hematologic:         Musculoskeletal:         GI/Hepatic:        (-) GERD   Renal/Genitourinary:     (+) Other Renal/ Genitourinary, ectopic pregnancy      Endo:         Psychiatric:         Infectious Disease:         Malignancy:         Other:                          Physical Exam  Normal systems: cardiovascular, pulmonary and dental    Airway   Mallampati: II  TM distance: >3 FB  Neck ROM: full    Dental     Cardiovascular       Pulmonary             Lab Results   Component Value Date    WBC 10.1 2020    HGB 11.4 (L) 2020    HCT 34.1 (L) 2020     (H) 2020     2020    POTASSIUM 3.4 2020    CHLORIDE 105 2020    CO2 26 2020    BUN 12 2020    CR 0.88 2020     (H) 2020    JUNI 8.3 (L) 2020       Preop Vitals  BP Readings from Last 3 Encounters:   20 112/75    Pulse Readings from Last 3 Encounters:   20 92      Resp Readings from Last 3 Encounters:   20 18    SpO2 Readings from Last 3 Encounters:   20 100%      Temp Readings from Last 1 Encounters:   20 36.4  C (97.6  F) (Temporal)    Ht Readings from Last 1 Encounters:   No data found for Ht      Wt Readings from Last 1 Encounters:   No data found for Wt    There is no height or weight on file to calculate BMI.       Anesthesia Plan      History & Physical  Review  History and physical reviewed and following examination; no interval change.    ASA Status:  1 emergent.    NPO Status:  > 8 hours    Plan for General with Intravenous induction. Maintenance will be Balanced.    PONV prophylaxis:  Ondansetron (or other 5HT-3) and Dexamethasone or Solumedrol  Toradol 30mg IV        Postoperative Care  Postoperative pain management:  IV analgesics and Multi-modal analgesia.      Consents  Anesthetic plan, risks, benefits and alternatives discussed with:  Patient..                 Florentino Krause MD

## 2020-07-30 NOTE — PROGRESS NOTES
"S: Pt doing well.  Pain is well controlled.    O: /73 (BP Location: Left arm)   Pulse 72   Temp 98.3  F (36.8  C) (Oral)   Resp 12   Ht 1.626 m (5' 4\")   Wt 56.7 kg (124 lb 14.4 oz)   SpO2 100%   BMI 21.44 kg/m          ABD:  A little distended, mild tenderness         INC: clean/dry/intact                Hemoglobin   Date Value Ref Range Status   07/30/2020 11.3 (L) 11.7 - 15.7 g/dL Final     hgb stable  This am  11.3    hcg dropped a lot to  471 from 876 just before surgery      Lab Results   Component Value Date    RH Done 07/30/2020       A: Post-op Day #1 s/p laproscopic bilateral salpingectomy for ectopic pregnancy       Severe endometriosis, both fallopian tubes removed during surgery       Failed ivf cycle due to ectopic pregnancy    P: Continue Post Op Cares      Discharge      See Dr Vazquez for post op visit in clinic on Aug 4      Patient does not need ultrasound at post op visit but will need to have hcg level followed once a week till negative      Will notify patient when path report back      Discussed what was found and surgery and that we removed both tubes since both were severely damaged, hoping to reduce chance of another ectopic with her next ivf cycle    "

## 2020-07-30 NOTE — OP NOTE
Procedure Date: 07/29/2020      PREOPERATIVE DIAGNOSIS:  Probable right ectopic tubal pregnancy and a hemorrhagic left adnexal mass.      POSTOPERATIVE DIAGNOSIS: Probable right ectopic tubal pregnancy and a hemorrhagic left adnexal mass, severe endometriosis with severe pelvic adhesions, and endometrioma of the left fallopian tube.      PROCEDURE:  Pelviscopy and laparoscopic bilateral salpingectomy with lysis of severe pelvic adhesions.      SURGEON:  Kimberly Magana MD      ASSISTANT:  Criss Franklin MD.  I needed Dr. Franklin' assistance for mobilization of dense bowel and adnexal adhesions.      ANESTHESIA:  General.      ESTIMATED BLOOD LOSS:  100 mL      FINDINGS:  The patient had an endometrioma involving the left fallopian tube.  She had normal-appearing left ovary.  She had a very swollen right fallopian tube with a 5 cm swollen mass inside the tube on the right and extremely severe adhesions between bowel and posterior uterus, obliterating the posterior cul-de-sac and  both fallopian tubes were densely adherent to the posterior mesosalpinx and ovaries on both sides. Right ovary was hard to identify at all. Uterus had a pedunculated fibroid on left fundus.       TECHNIQUE:  The patient was taken to the operating room and placed in supine position.  Anesthesia was administered.  She was placed in lithotomy position.  She was prepped and draped.  A time out procedure was carried out.  We placed a sponge stick in the vagina after the bladder was catheterized.  Then we returned to the abdomen.  Subumbilical area was infiltrated with local anesthesia.  She received an antibiotic at beginning of the case.  We inserted the Veress needle followed by insufflation and then we inserted the laparoscope.  Pelvis was explored.  She had free blood in the pelvis in both anterior and posterior cul-de-sacs, and findings were as noted above.  We took extensive amount of time to free up  Adhesions around ovaries and fallopian  tubes until we could identify the left ovary and then we were able to dissect free the left fallopian tube.  We took out the left fallopian tube because it was clearly heavily involved in an endometrioma, which ruptured while we were exploring the pelvis.  Then when that was completed, the tube was removed separately and sent for pathology.  Next, we went to the right side. We were able to free up the right fallopian tube after extending quite a bit of time to clear the adhesions.  It was difficult to see the right ovary on that side as it appeared to be very adherent to the pelvic sidewall.  We dissected the right tube free using the Thunderbeat instrument and then removed that tube also. we  irrigated and suctioned to remove the bloody irrigation fluid.  She did not have any active bleeding at that point but she definitely had some sort of raw surface areas so hemostatic powder was placed over the pelvic tissue surfaces.  We observed further and there was no pooling of blood.  all the excess gas was allowed to escape.  The instruments were removed and the incisions were closed with 4-0 Monocryl in a subcuticular fashion.  Steri-Strips and dressings were applied and  patient went to the recovery room in stable condition.  Pictures were obtained. Both fallopian tubes were sent to path.        SHARON CHING MD             D: 2020   T: 2020   MT: JAD      Name:     NOE HUITRON   MRN:      [id number]        Account:        [id number]   :      1985           Procedure Date: 2020      Document: [id number]

## 2020-07-30 NOTE — ANESTHESIA CARE TRANSFER NOTE
Patient: Sherry George    Procedure(s):  PELVISCOPY, BILATERAL SALPINGECTOMY, LYSIS OF ADHESIONS  pelviscopy, right salpingectomy    Diagnosis: Ectopic pregnancy [O00.90]  Diagnosis Additional Information: No value filed.    Anesthesia Type:   General     Note:  Airway :Face Mask  Patient transferred to:PACU  Handoff Report: Identifed the Patient, Identified the Reponsible Provider, Reviewed the pertinent medical history, Discussed the surgical course, Reviewed Intra-OP anesthesia mangement and issues during anesthesia, Set expectations for post-procedure period and Allowed opportunity for questions and acknowledgement of understanding      Vitals: (Last set prior to Anesthesia Care Transfer)    CRNA VITALS  7/29/2020 2330 - 7/30/2020 0002      7/30/2020             NIBP:  122/86    NIBP Mean:  99                Electronically Signed By: GRAHAM Munoz CRNA  July 30, 2020  12:02 AM  
Foreign body aspiration

## 2020-07-30 NOTE — BRIEF OP NOTE
[facility]    Brief Operative Note    Pre-operative diagnosis: Right Ectopic tubal  pregnancy   Post-operative diagnosis Same as pre-operative diagnosis                                                       Procedure: Procedure(s):  PELVISCOPY, BILATERAL SALPINGECTOMY, LYSIS OF ADHESIONS  pelviscopy, right salpingectomy  Surgeon: Surgeon(s) and Role:     * Kimberly Magana MD - Primary     * Criss Franklin MD - Assisting  Anesthesia: General   Estimated blood loss:  100 ml    Specimens:   ID Type Source Tests Collected by Time Destination   A : LEFT FALLOPIAN TUBE Tissue Fallopian Tube, Left SURGICAL PATHOLOGY EXAM Kimberly Magana MD 7/29/2020 11:01 PM    B : RIGHT FALLOPIAN TUBE  Tissue Fallopian Tube, Left SURGICAL PATHOLOGY EXAM Kimberly Magana MD 7/29/2020 11:16 PM      Findings   Severe endometriosis, probable right tubal ectopic pregnancy, endometrioma of left fallopian tube, severe pelvic adhesions  Complications: None.

## 2020-07-30 NOTE — PLAN OF CARE
POD1. A&Ox4. VSS. Capno WNL. Denies pain. Clear liquid diet, tolerating well. BS hypoactive. R PIV infusing LR @ 100 mL/hr. SBA in room overnight. Possible discharge 7/30.

## 2020-07-30 NOTE — ANESTHESIA POSTPROCEDURE EVALUATION
Patient: Sherry George    Procedure(s):  PELVISCOPY, BILATERAL SALPINGECTOMY, LYSIS OF ADHESIONS  pelviscopy, right salpingectomy    Diagnosis:Ectopic pregnancy [O00.90]  Diagnosis Additional Information: No value filed.    Anesthesia Type:  General    Note:  Anesthesia Post Evaluation    Patient location during evaluation: PACU  Patient participation: Able to fully participate in evaluation  Level of consciousness: awake  Pain management: adequate  Airway patency: patent  Cardiovascular status: acceptable  Respiratory status: acceptable  Hydration status: acceptable  PONV: controlled     Anesthetic complications: None          Last vitals:  Vitals:    07/30/20 0100 07/30/20 0114 07/30/20 0141   BP: 115/75  107/69   Pulse: 72     Resp: 13  12   Temp: 36.5  C (97.7  F)  35.4  C (95.8  F)   SpO2: 100% 100% 100%         Electronically Signed By: Florentino Krause MD  July 30, 2020  7:41 AM

## 2020-07-30 NOTE — PROGRESS NOTES
AVS reviewed.  Oxy rx filled. Pt with mild gas pains, walked halls.  Giorgio full liquid diet. Will f/u with obgyn regarding rogham injection. VSS

## 2020-07-31 NOTE — PROGRESS NOTES
Cg      SUBJECTIVE:                                                   Sherry George is a 34 year old female who presents to clinic today for the following health issue(s):  Patient presents with:  Post-op Visit: Pelviscopy and laparoscopic bilateral salpingectomy with lysis of severe pelvic adhesions, FU ectopic pregnancy      Additional information: Patient was wondering when she can start her pills again. And when she can have her next IVF cycle.    HPI:  Patient presenting for postop.  Patient had her bilateral fallopian tubes removed with ectopic pregnancy.  On pathology there was implantation site noted however no chorionic villi.  Her hCG fell today to 20.  She is not having any pain.  The only concern she has is on her right lower quadrant incision site she has a 2 to 3 mm blister that she developed after the performed the surgery.  She has not noticed any firmness or erythema.    Patient's last menstrual period was 2020 (exact date)..     Patient is not sexually active, .  Using none for contraception.    reports that she has never smoked. She has never used smokeless tobacco.    STD testing offered?  Declined    Health maintenance updated:  yes    Today's PHQ-2 Score: No flowsheet data found.  Today's PHQ-9 Score: No flowsheet data found.  Today's SUMAN-7 Score: No flowsheet data found.    Problem list and histories reviewed & adjusted, as indicated.  Additional history: as documented.    Patient Active Problem List   Diagnosis     Ectopic pregnancy, tubal     Past Surgical History:   Procedure Laterality Date     LAPAROSCOPIC EVACUATION ECTOPIC PREGNANCY Right 2020    Procedure: pelviscopy, right salpingectomy;  Surgeon: Kimberly Magana MD;  Location:  OR     SALPINGECTOMY Bilateral 2020    Procedure: PELVISCOPY, BILATERAL SALPINGECTOMY, LYSIS OF ADHESIONS;  Surgeon: Kimberly Magana MD;  Location:  OR      Social History     Tobacco Use     Smoking status: Never Smoker      "Smokeless tobacco: Never Used   Substance Use Topics     Alcohol use: Not Currently           Current Outpatient Medications   Medication Sig     Prenatal Vit-Fe Fumarate-FA (PRENATAL MULTIVITAMIN  PLUS IRON) 27-1 MG TABS Take 1 tablet by mouth daily     oxyCODONE (ROXICODONE) 5 MG tablet Take 1 tablet (5 mg) by mouth every 6 hours as needed for severe pain (Patient not taking: Reported on 8/4/2020)     No current facility-administered medications for this visit.      No Known Allergies    ROS:  12 point review of systems negative other than symptoms noted below or in the HPI.  Gastrointestinal: Bloating  No urinary frequency or dysuria, bladder or kidney problems      OBJECTIVE:     /64   Pulse 84   Ht 1.626 m (5' 4\")   Wt 55.5 kg (122 lb 6.4 oz)   LMP 07/25/2020 (Exact Date)   Breastfeeding No   BMI 21.01 kg/m    Body mass index is 21.01 kg/m .    Exam:  Constitutional:  Appearance: Well nourished, well developed alert, in no acute distress  Chest:  Respiratory Effort:  Breathing unlabored.   Cardiovascular: Heart: Auscultation:  No edema  Skin: General Inspection:  Right lower quadrant with a small blister, closed, no erythema, no induration    In-Clinic Test Results:  No results found for this or any previous visit (from the past 24 hour(s)).    ASSESSMENT/PLAN:                                                        ICD-10-CM    1. Tubal ectopic pregnancy, unspecified laterality, unspecified whether intrauterine pregnancy present  O00.109 HCG Quantitative Pregnancy, Blood ([id number])     CANCELED: HCG Quantitative Pregnancy, Blood ([id number])       There are no Patient Instructions on file for this visit.    1. Blister RLQ- discussed keeping clean and dry, likely related to the plastic and drape.  Discussed neosporin and not opening it up.  No signs of infection at this time  2. HCG 20 today, will have repeat on Thursday.  Prior to it coming back did discussed methotrexate therapy, as the chorionic villi " wasn't present on the pathology    Leslie Vazquez MD  [facility]

## 2020-08-01 LAB
ABO + RH BLD: NORMAL
ABO + RH BLD: NORMAL
BLOOD BANK CMNT PATIENT-IMP: NORMAL
DATE RH IMM GL GVN: NORMAL
FETAL CELL SCN BLD QL ROSETTE: NORMAL
RH IG VIALS RECOM PATIENT: NORMAL

## 2020-08-03 LAB — COPATH REPORT: NORMAL

## 2020-08-04 ENCOUNTER — OFFICE VISIT (OUTPATIENT)
Dept: OBGYN | Facility: CLINIC | Age: 35
End: 2020-08-04
Payer: MEDICAID

## 2020-08-04 VITALS
BODY MASS INDEX: 20.9 KG/M2 | DIASTOLIC BLOOD PRESSURE: 64 MMHG | HEART RATE: 84 BPM | WEIGHT: 122.4 LBS | SYSTOLIC BLOOD PRESSURE: 116 MMHG | HEIGHT: 64 IN

## 2020-08-04 DIAGNOSIS — O00.109 TUBAL ECTOPIC PREGNANCY, UNSPECIFIED LATERALITY, UNSPECIFIED WHETHER INTRAUTERINE PREGNANCY PRESENT: Primary | ICD-10-CM

## 2020-08-04 LAB
ALBUMIN SERPL-MCNC: 3.1 G/DL (ref 3.4–5)
ALP SERPL-CCNC: 70 U/L (ref 40–150)
ALT SERPL W P-5'-P-CCNC: 13 U/L (ref 0–50)
ANION GAP SERPL CALCULATED.3IONS-SCNC: 5 MMOL/L (ref 3–14)
AST SERPL W P-5'-P-CCNC: 12 U/L (ref 0–45)
B-HCG SERPL-ACNC: 20 IU/L (ref 0–5)
BILIRUB SERPL-MCNC: 0.3 MG/DL (ref 0.2–1.3)
BUN SERPL-MCNC: 7 MG/DL (ref 7–30)
CALCIUM SERPL-MCNC: 9.1 MG/DL (ref 8.5–10.1)
CHLORIDE SERPL-SCNC: 107 MMOL/L (ref 94–109)
CO2 SERPL-SCNC: 28 MMOL/L (ref 20–32)
CREAT SERPL-MCNC: 0.71 MG/DL (ref 0.52–1.04)
ERYTHROCYTE [DISTWIDTH] IN BLOOD BY AUTOMATED COUNT: 12.5 % (ref 10–15)
GFR SERPL CREATININE-BSD FRML MDRD: >90 ML/MIN/{1.73_M2}
GLUCOSE SERPL-MCNC: 98 MG/DL (ref 70–99)
HCT VFR BLD AUTO: 31.2 % (ref 35–47)
HGB BLD-MCNC: 10.4 G/DL (ref 11.7–15.7)
MCH RBC QN AUTO: 27.8 PG (ref 26.5–33)
MCHC RBC AUTO-ENTMCNC: 33.3 G/DL (ref 31.5–36.5)
MCV RBC AUTO: 83 FL (ref 78–100)
PLATELET # BLD AUTO: 517 10E9/L (ref 150–450)
POTASSIUM SERPL-SCNC: 3.9 MMOL/L (ref 3.4–5.3)
PROT SERPL-MCNC: 8.2 G/DL (ref 6.8–8.8)
RBC # BLD AUTO: 3.74 10E12/L (ref 3.8–5.2)
SODIUM SERPL-SCNC: 140 MMOL/L (ref 133–144)
WBC # BLD AUTO: 8.1 10E9/L (ref 4–11)

## 2020-08-04 PROCEDURE — 36415 COLL VENOUS BLD VENIPUNCTURE: CPT | Performed by: OBSTETRICS & GYNECOLOGY

## 2020-08-04 PROCEDURE — 80053 COMPREHEN METABOLIC PANEL: CPT | Performed by: OBSTETRICS & GYNECOLOGY

## 2020-08-04 PROCEDURE — 85027 COMPLETE CBC AUTOMATED: CPT | Performed by: OBSTETRICS & GYNECOLOGY

## 2020-08-04 PROCEDURE — 84702 CHORIONIC GONADOTROPIN TEST: CPT | Performed by: OBSTETRICS & GYNECOLOGY

## 2020-08-04 PROCEDURE — 99024 POSTOP FOLLOW-UP VISIT: CPT | Performed by: OBSTETRICS & GYNECOLOGY

## 2020-08-04 RX ORDER — CEPHALEXIN 500 MG/1
500 CAPSULE ORAL 2 TIMES DAILY
Qty: 14 CAPSULE | Refills: 0 | Status: ON HOLD | OUTPATIENT
Start: 2020-08-04 | End: 2021-03-19

## 2020-08-04 ASSESSMENT — MIFFLIN-ST. JEOR: SCORE: 1235.2

## 2020-08-06 ENCOUNTER — TELEPHONE (OUTPATIENT)
Dept: OBGYN | Facility: CLINIC | Age: 35
End: 2020-08-06

## 2020-08-06 DIAGNOSIS — O00.109 TUBAL ECTOPIC PREGNANCY, UNSPECIFIED LATERALITY, UNSPECIFIED WHETHER INTRAUTERINE PREGNANCY PRESENT: ICD-10-CM

## 2020-08-06 DIAGNOSIS — O00.109 TUBAL ECTOPIC PREGNANCY, UNSPECIFIED LATERALITY, UNSPECIFIED WHETHER INTRAUTERINE PREGNANCY PRESENT: Primary | ICD-10-CM

## 2020-08-06 LAB — B-HCG SERPL-ACNC: 10 IU/L (ref 0–5)

## 2020-08-06 PROCEDURE — 36415 COLL VENOUS BLD VENIPUNCTURE: CPT | Performed by: OBSTETRICS & GYNECOLOGY

## 2020-08-06 PROCEDURE — 84702 CHORIONIC GONADOTROPIN TEST: CPT | Performed by: OBSTETRICS & GYNECOLOGY

## 2020-08-10 DIAGNOSIS — O00.109 TUBAL ECTOPIC PREGNANCY, UNSPECIFIED LATERALITY, UNSPECIFIED WHETHER INTRAUTERINE PREGNANCY PRESENT: ICD-10-CM

## 2020-08-10 LAB — B-HCG SERPL-ACNC: 10 IU/L (ref 0–5)

## 2020-08-10 PROCEDURE — 36415 COLL VENOUS BLD VENIPUNCTURE: CPT | Performed by: OBSTETRICS & GYNECOLOGY

## 2020-08-10 PROCEDURE — 84702 CHORIONIC GONADOTROPIN TEST: CPT | Performed by: OBSTETRICS & GYNECOLOGY

## 2020-08-11 ENCOUNTER — HOSPITAL ENCOUNTER (OUTPATIENT)
Facility: CLINIC | Age: 35
Discharge: HOME | End: 2020-08-11
Attending: OBSTETRICS & GYNECOLOGY | Admitting: OBSTETRICS & GYNECOLOGY
Payer: MEDICAID

## 2020-08-11 ENCOUNTER — TELEPHONE (OUTPATIENT)
Dept: OBGYN | Facility: CLINIC | Age: 35
End: 2020-08-11

## 2020-08-11 ENCOUNTER — OFFICE VISIT (OUTPATIENT)
Dept: OBGYN | Facility: CLINIC | Age: 35
End: 2020-08-11
Payer: MEDICAID

## 2020-08-11 VITALS
TEMPERATURE: 98.4 F | HEART RATE: 95 BPM | BODY MASS INDEX: 21 KG/M2 | WEIGHT: 123 LBS | RESPIRATION RATE: 16 BRPM | DIASTOLIC BLOOD PRESSURE: 72 MMHG | HEIGHT: 64 IN | SYSTOLIC BLOOD PRESSURE: 102 MMHG

## 2020-08-11 VITALS
HEIGHT: 64 IN | DIASTOLIC BLOOD PRESSURE: 66 MMHG | SYSTOLIC BLOOD PRESSURE: 96 MMHG | HEART RATE: 84 BPM | WEIGHT: 123.2 LBS | BODY MASS INDEX: 21.03 KG/M2

## 2020-08-11 DIAGNOSIS — O00.109 TUBAL ECTOPIC PREGNANCY, UNSPECIFIED LATERALITY, UNSPECIFIED WHETHER INTRAUTERINE PREGNANCY PRESENT: Primary | ICD-10-CM

## 2020-08-11 DIAGNOSIS — O00.80 OTHER ECTOPIC PREGNANCY WITHOUT INTRAUTERINE PREGNANCY: ICD-10-CM

## 2020-08-11 DIAGNOSIS — O00.109 TUBAL ECTOPIC PREGNANCY, UNSPECIFIED LATERALITY, UNSPECIFIED WHETHER INTRAUTERINE PREGNANCY PRESENT: ICD-10-CM

## 2020-08-11 PROBLEM — O00.90 ECTOPIC PREGNANCY WITHOUT INTRAUTERINE PREGNANCY, UNSPECIFIED LOCATION: Status: ACTIVE | Noted: 2020-08-11

## 2020-08-11 LAB
ALBUMIN SERPL-MCNC: 3.4 G/DL (ref 3.4–5)
ALP SERPL-CCNC: 71 U/L (ref 40–150)
ALT SERPL W P-5'-P-CCNC: 16 U/L (ref 0–50)
ANION GAP SERPL CALCULATED.3IONS-SCNC: 3 MMOL/L (ref 3–14)
AST SERPL W P-5'-P-CCNC: 11 U/L (ref 0–45)
B-HCG SERPL-ACNC: 10 IU/L (ref 0–5)
BILIRUB SERPL-MCNC: 0.3 MG/DL (ref 0.2–1.3)
BLOOD BANK CMNT PATIENT-IMP: NORMAL
BUN SERPL-MCNC: 10 MG/DL (ref 7–30)
CALCIUM SERPL-MCNC: 9.1 MG/DL (ref 8.5–10.1)
CHLORIDE SERPL-SCNC: 106 MMOL/L (ref 94–109)
CO2 SERPL-SCNC: 29 MMOL/L (ref 20–32)
CREAT SERPL-MCNC: 0.76 MG/DL (ref 0.52–1.04)
ERYTHROCYTE [DISTWIDTH] IN BLOOD BY AUTOMATED COUNT: 12.8 % (ref 10–15)
GFR SERPL CREATININE-BSD FRML MDRD: >90 ML/MIN/{1.73_M2}
GLUCOSE SERPL-MCNC: 94 MG/DL (ref 70–99)
HCT VFR BLD AUTO: 34.1 % (ref 35–47)
HGB BLD-MCNC: 11.3 G/DL (ref 11.7–15.7)
MCH RBC QN AUTO: 27.6 PG (ref 26.5–33)
MCHC RBC AUTO-ENTMCNC: 33.1 G/DL (ref 31.5–36.5)
MCV RBC AUTO: 83 FL (ref 78–100)
PLATELET # BLD AUTO: 571 10E9/L (ref 150–450)
POTASSIUM SERPL-SCNC: 4.1 MMOL/L (ref 3.4–5.3)
PROT SERPL-MCNC: 8.2 G/DL (ref 6.8–8.8)
RBC # BLD AUTO: 4.1 10E12/L (ref 3.8–5.2)
SODIUM SERPL-SCNC: 138 MMOL/L (ref 133–144)
WBC # BLD AUTO: 8.6 10E9/L (ref 4–11)

## 2020-08-11 PROCEDURE — 99024 POSTOP FOLLOW-UP VISIT: CPT | Performed by: OBSTETRICS & GYNECOLOGY

## 2020-08-11 PROCEDURE — 96401 CHEMO ANTI-NEOPL SQ/IM: CPT

## 2020-08-11 PROCEDURE — 36415 COLL VENOUS BLD VENIPUNCTURE: CPT | Performed by: OBSTETRICS & GYNECOLOGY

## 2020-08-11 PROCEDURE — 25000128 H RX IP 250 OP 636: Performed by: OBSTETRICS & GYNECOLOGY

## 2020-08-11 PROCEDURE — 84702 CHORIONIC GONADOTROPIN TEST: CPT | Performed by: OBSTETRICS & GYNECOLOGY

## 2020-08-11 PROCEDURE — 85027 COMPLETE CBC AUTOMATED: CPT | Performed by: OBSTETRICS & GYNECOLOGY

## 2020-08-11 PROCEDURE — 80053 COMPREHEN METABOLIC PANEL: CPT | Performed by: OBSTETRICS & GYNECOLOGY

## 2020-08-11 RX ORDER — METHOTREXATE 25 MG/ML
50 INJECTION, SOLUTION INTRA-ARTERIAL; INTRAMUSCULAR; INTRATHECAL; INTRAVENOUS ONCE
Status: CANCELLED | OUTPATIENT
Start: 2020-08-11

## 2020-08-11 RX ORDER — METHOTREXATE 25 MG/ML
50 INJECTION INTRA-ARTERIAL; INTRAMUSCULAR; INTRATHECAL; INTRAVENOUS ONCE
Status: COMPLETED | OUTPATIENT
Start: 2020-08-11 | End: 2020-08-11

## 2020-08-11 RX ADMIN — METHOTREXATE 79.5 MG: 25 INJECTION, SOLUTION INTRA-ARTERIAL; INTRAMUSCULAR; INTRATHECAL; INTRAVENOUS at 22:12

## 2020-08-11 ASSESSMENT — MIFFLIN-ST. JEOR
SCORE: 1237.92
SCORE: 1238.83

## 2020-08-11 NOTE — PROGRESS NOTES
SUBJECTIVE:                                                   Sherry George is a 34 year old female who presents to clinic today for the following health issue(s):  Patient presents with:  Follow Up: FU to ectopic pregnancy. HCG levels are not decreasing from 10 IU/L. Patient had also had a Pelviscopy and laparoscopic bilateral salpingectomy with lysis of severe pelvic adhesions.        HPI:  She has history of ectopic pregnancy.  She had bilateral tubes removed.  She had no chorionic villi noted on pathology.  Her hcg went down and has since reached a plateau at 10.    Patient's last menstrual period was 2020 (exact date)..     Patient is not sexually active, .  Using none for contraception.    reports that she has never smoked. She has never used smokeless tobacco.    STD testing offered?  Declined    Health maintenance updated:  yes    Today's PHQ-2 Score: No flowsheet data found.  Today's PHQ-9 Score: No flowsheet data found.  Today's SUMAN-7 Score: No flowsheet data found.    Problem list and histories reviewed & adjusted, as indicated.  Additional history: as documented.    Patient Active Problem List   Diagnosis     Ectopic pregnancy, tubal     Tubal ectopic pregnancy, unspecified laterality, unspecified whether intrauterine pregnancy present     Past Surgical History:   Procedure Laterality Date     LAPAROSCOPIC EVACUATION ECTOPIC PREGNANCY Right 2020    Procedure: pelviscopy, right salpingectomy;  Surgeon: Kimberly Magana MD;  Location:  OR     SALPINGECTOMY Bilateral 2020    Procedure: PELVISCOPY, BILATERAL SALPINGECTOMY, LYSIS OF ADHESIONS;  Surgeon: Kimberly Magana MD;  Location:  OR      Social History     Tobacco Use     Smoking status: Never Smoker     Smokeless tobacco: Never Used   Substance Use Topics     Alcohol use: Not Currently           Current Outpatient Medications   Medication Sig     cephALEXin (KEFLEX) 500 MG capsule Take 1 capsule (500 mg) by mouth 2 times  "daily     Prenatal Vit-Fe Fumarate-FA (PRENATAL MULTIVITAMIN  PLUS IRON) 27-1 MG TABS Take 1 tablet by mouth daily     No current facility-administered medications for this visit.      No Known Allergies    ROS:  12 point review of systems negative other than symptoms noted below or in the HPI.  Genitourinary: Incontinence  No urinary frequency or dysuria, bladder or kidney problems      OBJECTIVE:     BP 96/66   Pulse 84   Ht 1.626 m (5' 4\")   Wt 55.9 kg (123 lb 3.2 oz)   LMP 07/25/2020 (Exact Date)   Breastfeeding No   BMI 21.15 kg/m    Body mass index is 21.15 kg/m .    Exam:  Constitutional:  Appearance: Well nourished, well developed alert, in no acute distress  Chest:  Respiratory Effort:  Breathing unlabored.   Cardiovascular: Heart: Auscultation:  No edema    In-Clinic Test Results:  No results found for this or any previous visit (from the past 24 hour(s)).    ASSESSMENT/PLAN:                                                        ICD-10-CM    1. Tubal ectopic pregnancy, unspecified laterality, unspecified whether intrauterine pregnancy present  O00.109 HCG quantitative pregnancy     CBC with platelets     **Comprehensive metabolic panel FUTURE anytime       There are no Patient Instructions on file for this visit.    1. Labs today  2. Discussed methotrexate, plan in giving methotrexate today and following hcg.  Concern for abdominal ectopic at this time.  Abnormal pregnancy    Leslie Vazquez MD  [facility]  "

## 2020-08-12 NOTE — PLAN OF CARE
Patient is here for a methotrexate injection.  Arrived via ambulation and support is waiting in the car outside of the hospital.  Verbal consent obtained for cares, vital signs were taken, and admission questions answered.

## 2020-08-12 NOTE — PLAN OF CARE
Discharge paperwork reviewed with patient and all questions answered at this time.  Patient stated she will call clinic tomorrow morning to make an appointment for follow up labs on Friday.

## 2020-08-12 NOTE — DISCHARGE INSTRUCTIONS
Methotrexate to Treat an Ectopic Pregnancy  An ectopic pregnancy is when a fertilized egg implants outside the womb (uterus). In most cases, it implants in a fallopian tube. This is a tube that goes from the uterus to an ovary. When this happens, the embryo can t grow normally. In some cases, it may stop growing quickly. Or it may grow until the fallopian tube tears (ruptures). This can cause severe bleeding and a risk for death for the mother.  Methotrexate is a medicine that stops the embryo from growing. The tissue is then absorbed by the mother s body. This treatment can prevent the rupture, bleeding, and risk of death to the mother. Methotrexate is often used instead of surgery to remove the fetus. Surgery has risks, like bleeding, infection, scarring of the fallopian tube, infertility, and the risks of anesthesia.  Having methotrexate treatment  Methotrexate is most often given by a shot (injection) into a muscle. It can also be given through an IV.  After having the shot, you may have:    Mild abdominal pain or cramping    Nausea and vomiting    Diarrhea    Fatigue  Care at home  Once you are home, you can resume normal activities as you are able. You will have some bleeding and pain. While you are recovering, you can use acetaminophen for pain if advised by your healthcare provider.  Until your healthcare provider says it s OK, do NOT:    Take anti-inflammatory medicines (NSAIDs), like aspirin, ibuprofen, or naproxen    Have foods or vitamins that contain folic acid or folate (for example, prenatal vitamins have folate)    Drink alcohol    Take penicillin or certain other antibiotics    Use tampons or douche    Have sexual intercourse  Make sure to:    Avoid the sun during the first week after your shot. Sun can cause a rash during this time.    Use birth control for at least 3 months after treatment.    Talk with a counselor if you feel sadness or grief after pregnancy loss.  Follow up  You will have  blood tests several times in the weeks after you have the shot. This is to make sure that your pregnancy hormone (HCG) level is getting lower. This shows that the fetus is no longer growing. It may take up to 4 weeks for your level to drop to zero. Most women need only one shot. If HCG levels are not low enough, your healthcare provider may give you a second shot. In some cases, this treatment does not work and surgery is needed. Your healthcare provider can tell you more about surgery for ectopic pregnancy.    Call OB office to make an appointment for Friday, 8/14/20, for follow up HCG level.    When to call the healthcare provider  Call your healthcare provider if you have:    Lower abdominal pain that gets worse or doesn t go away    Heavy vaginal bleeding    Nausea or vomiting that needs treatment    Dizziness, weakness, or fainting    Fever of 100.4 F (38 C) or higher  Date Last Reviewed: 6/1/2016 2000-2019 [employer]. [address] [place] [address]. All rights reserved. This information is not intended as a substitute for professional medical care. Always follow your healthcare professional's instructions.

## 2020-08-12 NOTE — PROVIDER NOTIFICATION
08/11/20 2111   Provider Notification   Provider Name/Title Dr. Miller   Method of Notification Phone   Request Evaluate - Remote   Notification Reason Status Update     Dr. Erica Vazquez called in regards to her pt scheduled for methotrexate injection tonight.  No orders placed.  Labs from office for hCG, WBC count, Hemogloblin, Platelet Count, AST, Creatinine, Rh Factor, and OB US in Chart.       HCG monitored since that time in office.  Results for NOE HUITRON (MRN [id number]) as of 8/11/2020 21:26   Ref. Range 7/30/2020 07:06 8/4/2020 11:38 8/4/2020 12:04 8/6/2020 13:57 8/10/2020 11:08 8/11/2020 14:07   HCG Quantitative Serum Latest Ref Range: 0 - 5 IU/L 471 (H) 20 (H)  10 (H) 10 (H) 10 (H)       Labs results in office:  Results for NOE HUITRON (MRN [id number]) as of 8/11/2020 21:26   Ref. Range 8/11/2020 14:07   Sodium Latest Ref Range: 133 - 144 mmol/L 138   Potassium Latest Ref Range: 3.4 - 5.3 mmol/L 4.1   Chloride Latest Ref Range: 94 - 109 mmol/L 106   Carbon Dioxide Latest Ref Range: 20 - 32 mmol/L 29   Urea Nitrogen Latest Ref Range: 7 - 30 mg/dL 10   Creatinine Latest Ref Range: 0.52 - 1.04 mg/dL 0.76   GFR Estimate Latest Ref Range: >60 mL/min/1.73_m2 >90   GFR Estimate If Black Latest Ref Range: >60 mL/min/1.73_m2 >90   Calcium Latest Ref Range: 8.5 - 10.1 mg/dL 9.1   Anion Gap Latest Ref Range: 3 - 14 mmol/L 3   Albumin Latest Ref Range: 3.4 - 5.0 g/dL 3.4   Protein Total Latest Ref Range: 6.8 - 8.8 g/dL 8.2   Bilirubin Total Latest Ref Range: 0.2 - 1.3 mg/dL 0.3   Alkaline Phosphatase Latest Ref Range: 40 - 150 U/L 71   ALT Latest Ref Range: 0 - 50 U/L 16   AST Latest Ref Range: 0 - 45 U/L 11   HCG Quantitative Serum Latest Ref Range: 0 - 5 IU/L 10 (H)   Glucose Latest Ref Range: 70 - 99 mg/dL 94   WBC Latest Ref Range: 4.0 - 11.0 10e9/L 8.6   Hemoglobin Latest Ref Range: 11.7 - 15.7 g/dL 11.3 (L)   Hematocrit Latest Ref Range: 35.0 - 47.0 % 34.1 (L)   Platelet Count Latest Ref  Range: 150 - 450 10e9/L 571 (H)   RBC Count Latest Ref Range: 3.8 - 5.2 10e12/L 4.10   MCV Latest Ref Range: 78 - 100 fl 83   MCH Latest Ref Range: 26.5 - 33.0 pg 27.6   MCHC Latest Ref Range: 31.5 - 36.5 g/dL 33.1   RDW Latest Ref Range: 10.0 - 15.0 % 12.8       Ultrasound results on 7/29 in chart states no intrauterine pregnancy noted.  Pt had surgery at his time and bilateral salpingectomy due to possible ectopic pregnancy and severe endometriosis noted per MD note.    Type and Screen done on 7/29 and pt received Rhogam on 7/30.    Consents signed for Methotrexate Therapy Treatment today in the office with Dr. Angela Miller denies need for Rhogam since given on 7/30.  Ultrasound not needed since last US showed no intrauterine pregnancy on 7/29.  Pt is not sexually active since ultrasound.  Plan to discharge pt home after Methotrexate and follow up in the office on Friday for labs.

## 2020-08-13 ENCOUNTER — DOCUMENTATION ONLY (OUTPATIENT)
Dept: OBGYN | Facility: CLINIC | Age: 35
End: 2020-08-13

## 2020-08-13 DIAGNOSIS — O00.80 OTHER ECTOPIC PREGNANCY WITHOUT INTRAUTERINE PREGNANCY: Primary | ICD-10-CM

## 2020-08-13 NOTE — PROGRESS NOTES
There are no orders for this patient for the upcoming lab visit. Please order labs as needed.     Reason for visit HCG.    Thank you, lab.

## 2020-08-14 DIAGNOSIS — O00.80 OTHER ECTOPIC PREGNANCY WITHOUT INTRAUTERINE PREGNANCY: Primary | ICD-10-CM

## 2020-08-14 DIAGNOSIS — O00.80 OTHER ECTOPIC PREGNANCY WITHOUT INTRAUTERINE PREGNANCY: ICD-10-CM

## 2020-08-14 PROCEDURE — 84702 CHORIONIC GONADOTROPIN TEST: CPT | Performed by: INTERNAL MEDICINE

## 2020-08-14 PROCEDURE — 36415 COLL VENOUS BLD VENIPUNCTURE: CPT | Performed by: INTERNAL MEDICINE

## 2020-08-15 ENCOUNTER — DOCUMENTATION ONLY (OUTPATIENT)
Dept: OBGYN | Facility: CLINIC | Age: 35
End: 2020-08-15

## 2020-08-15 LAB — B-HCG SERPL-ACNC: 13 IU/L (ref 0–5)

## 2020-08-15 NOTE — PROGRESS NOTES
Discussed with patient plan again.  She had lab drawn yesterday day 4 which isn't returned yet.  She has a stat hcg ordered for Tuesday to determine if anything additional needs to occur.

## 2020-08-18 DIAGNOSIS — O00.109 TUBAL ECTOPIC PREGNANCY, UNSPECIFIED LATERALITY, UNSPECIFIED WHETHER INTRAUTERINE PREGNANCY PRESENT: ICD-10-CM

## 2020-08-18 PROCEDURE — 36415 COLL VENOUS BLD VENIPUNCTURE: CPT | Performed by: OBSTETRICS & GYNECOLOGY

## 2020-08-18 PROCEDURE — 84702 CHORIONIC GONADOTROPIN TEST: CPT | Performed by: OBSTETRICS & GYNECOLOGY

## 2020-08-19 LAB — B-HCG SERPL-ACNC: 7 IU/L (ref 0–5)

## 2020-08-19 NOTE — RESULT ENCOUNTER NOTE
Dear Sherry Barrera,   The HCG dropped nicely. Plan to repeat in a week to be sure it becomes zero.    Odilon Browning MD

## 2020-08-21 ENCOUNTER — TELEPHONE (OUTPATIENT)
Dept: OBGYN | Facility: CLINIC | Age: 35
End: 2020-08-21

## 2020-08-21 NOTE — TELEPHONE ENCOUNTER
Patient had a procedure with Dr Magana and has questions regarding the stitches and if they are supposed to dissolve? Please return call.

## 2020-08-21 NOTE — TELEPHONE ENCOUNTER
No reddness and no swelling.  Informed stitches dissolvable.  Will not disolve on the outside but internally and should fall off with washing.  If they remain after 2 more weeks should contact clinic or for any concerns. Pt verbalized understanding, in agreement with plan, and voiced no further questions.    Kita Alas RN on 8/21/2020 at 12:53 PM

## 2020-08-27 DIAGNOSIS — O00.101 RIGHT TUBAL PREGNANCY WITHOUT INTRAUTERINE PREGNANCY: ICD-10-CM

## 2020-08-27 PROCEDURE — 84702 CHORIONIC GONADOTROPIN TEST: CPT | Performed by: OBSTETRICS & GYNECOLOGY

## 2020-08-27 PROCEDURE — 36415 COLL VENOUS BLD VENIPUNCTURE: CPT | Performed by: OBSTETRICS & GYNECOLOGY

## 2020-08-28 LAB — B-HCG SERPL-ACNC: <1 IU/L (ref 0–5)

## 2020-09-25 ENCOUNTER — TRANSFERRED RECORDS (OUTPATIENT)
Dept: HEALTH INFORMATION MANAGEMENT | Facility: CLINIC | Age: 35
End: 2020-09-25

## 2020-09-30 ENCOUNTER — TRANSFERRED RECORDS (OUTPATIENT)
Dept: HEALTH INFORMATION MANAGEMENT | Facility: CLINIC | Age: 35
End: 2020-09-30

## 2020-10-05 DIAGNOSIS — O00.90 UNSPECIFIED ECTOPIC PREGNANCY WITHOUT INTRAUTERINE PREGNANCY: Primary | ICD-10-CM

## 2020-10-05 PROCEDURE — 36415 COLL VENOUS BLD VENIPUNCTURE: CPT | Performed by: OBSTETRICS & GYNECOLOGY

## 2020-10-05 PROCEDURE — 84702 CHORIONIC GONADOTROPIN TEST: CPT | Performed by: OBSTETRICS & GYNECOLOGY

## 2020-10-05 PROCEDURE — 85027 COMPLETE CBC AUTOMATED: CPT | Performed by: OBSTETRICS & GYNECOLOGY

## 2020-10-06 LAB
B-HCG SERPL-ACNC: <1 IU/L (ref 0–5)
ERYTHROCYTE [DISTWIDTH] IN BLOOD BY AUTOMATED COUNT: 14.9 % (ref 10–15)
HCT VFR BLD AUTO: 35.2 % (ref 35–47)
HGB BLD-MCNC: 11.9 G/DL (ref 11.7–15.7)
MCH RBC QN AUTO: 27.1 PG (ref 26.5–33)
MCHC RBC AUTO-ENTMCNC: 33.8 G/DL (ref 31.5–36.5)
MCV RBC AUTO: 80 FL (ref 78–100)
PLATELET # BLD AUTO: 431 10E9/L (ref 150–450)
RBC # BLD AUTO: 4.39 10E12/L (ref 3.8–5.2)
WBC # BLD AUTO: 8.8 10E9/L (ref 4–11)

## 2020-10-16 ENCOUNTER — TELEPHONE (OUTPATIENT)
Dept: OBGYN | Facility: CLINIC | Age: 35
End: 2020-10-16

## 2020-10-19 ENCOUNTER — MYC MEDICAL ADVICE (OUTPATIENT)
Dept: OBGYN | Facility: CLINIC | Age: 35
End: 2020-10-19

## 2020-10-21 NOTE — TELEPHONE ENCOUNTER
Called and left her another message.  Can we just have them write a letter if there is something that is needed to be communicated?

## 2020-10-21 NOTE — TELEPHONE ENCOUNTER
Called [facility] and gave email information for Dr. Vazquez for Dr. Mchugh to be able to connect with her regarding Sherry.  Tiffanie Suarez RN on 10/21/2020 at 11:39 AM

## 2020-10-26 ENCOUNTER — TRANSFERRED RECORDS (OUTPATIENT)
Dept: HEALTH INFORMATION MANAGEMENT | Facility: CLINIC | Age: 35
End: 2020-10-26

## 2020-11-30 ENCOUNTER — TRANSFERRED RECORDS (OUTPATIENT)
Dept: HEALTH INFORMATION MANAGEMENT | Facility: CLINIC | Age: 35
End: 2020-11-30

## 2020-12-03 ENCOUNTER — TRANSCRIBE ORDERS (OUTPATIENT)
Dept: OTHER | Age: 35
End: 2020-12-03

## 2020-12-03 DIAGNOSIS — N80.129 ENDOMETRIOMA: ICD-10-CM

## 2020-12-03 DIAGNOSIS — N80.9 ENDOMETRIOSIS: Primary | ICD-10-CM

## 2020-12-03 DIAGNOSIS — Z87.59 HX OF ECTOPIC PREGNANCY: ICD-10-CM

## 2020-12-03 DIAGNOSIS — N73.6 PELVIC ADHESIONS: ICD-10-CM

## 2020-12-14 ENCOUNTER — MYC MEDICAL ADVICE (OUTPATIENT)
Dept: OBGYN | Facility: CLINIC | Age: 35
End: 2020-12-14

## 2020-12-14 DIAGNOSIS — Z30.41 USES ORAL CONTRACEPTION: Primary | ICD-10-CM

## 2020-12-14 NOTE — TELEPHONE ENCOUNTER
Tubal pregnancy hx and last OV with Dr vazquze 8/11/20    Routing pt mychart request to Dr Vazquez to advise - office or phone visit for this or send Rx?    Darleen Betancourt RN on 12/14/2020 at 12:57 PM

## 2020-12-15 RX ORDER — DESOGESTREL AND ETHINYL ESTRADIOL 21-5 (28)
1 KIT ORAL DAILY
Qty: 90 TABLET | Refills: 0 | Status: ON HOLD | OUTPATIENT
Start: 2020-12-15 | End: 2021-03-19

## 2020-12-15 NOTE — TELEPHONE ENCOUNTER
"Spoke with pt and explained the comparable OCP to the \"Novynette\" brand (not available in [place]) is the Kariva 21/5 but explained to pt that the 5 tablets have hormones and to skip these as the Novynette is only 21 tabs and she does not take a pill for the week of her cycle.     Rx sent for 3 months. Pt verbalized understanding, in agreement with plan, and voiced no further questions  Darleen Betancourt RN on 12/15/2020 at 8:43 AM    "

## 2021-01-04 ENCOUNTER — HEALTH MAINTENANCE LETTER (OUTPATIENT)
Age: 36
End: 2021-01-04

## 2021-01-28 ENCOUNTER — MYC REFILL (OUTPATIENT)
Dept: OBGYN | Facility: CLINIC | Age: 36
End: 2021-01-28

## 2021-01-28 DIAGNOSIS — N89.8 VAGINAL DISCHARGE: ICD-10-CM

## 2021-01-29 RX ORDER — FLUCONAZOLE 150 MG/1
TABLET ORAL
Qty: 2 TABLET | Refills: 0 | OUTPATIENT
Start: 2021-01-29

## 2021-01-29 NOTE — TELEPHONE ENCOUNTER
"Requested Prescriptions   Pending Prescriptions Disp Refills     fluconazole (DIFLUCAN) 150 MG tablet 2 tablet 0     Sig: Take one tablet today and another tablet in 2 days after taking the first tablet       Antifungal Agents Failed - 1/28/2021  7:13 PM        Failed - Not Fluconazole or Terconazole      If oral Fluconazole or Terconazole, may refill if indicated in progress notes.           Passed - Recent (12 mo) or future (30 days) visit within the authorizing provider's specialty     Patient has had an office visit with the authorizing provider or a provider within the authorizing providers department within the previous 12 mos or has a future within next 30 days. See \"Patient Info\" tab in inbasket, or \"Choose Columns\" in Meds & Orders section of the refill encounter.              Passed - Medication is active on med list           Refill request not appropriate.  Pt needs to contact clinic/schedule to be seen.    Loree Harrison RN on 1/29/2021 at 7:53 AM    "

## 2021-02-08 ENCOUNTER — TELEPHONE (OUTPATIENT)
Dept: OBGYN | Facility: CLINIC | Age: 36
End: 2021-02-08

## 2021-02-08 NOTE — TELEPHONE ENCOUNTER
Records from [facility] received for upcoming visit with Dr. Nelson on 2/11/. Scanned to chart and placed in file cabinet.

## 2021-02-11 ENCOUNTER — VIRTUAL VISIT (OUTPATIENT)
Dept: OBGYN | Facility: CLINIC | Age: 36
End: 2021-02-11
Attending: OBSTETRICS & GYNECOLOGY
Payer: COMMERCIAL

## 2021-02-11 DIAGNOSIS — D25.0 SUBMUCOUS LEIOMYOMA OF UTERUS: ICD-10-CM

## 2021-02-11 DIAGNOSIS — Z87.59 HISTORY OF ECTOPIC PREGNANCY: Primary | ICD-10-CM

## 2021-02-11 PROCEDURE — 99205 OFFICE O/P NEW HI 60 MIN: CPT | Mod: 95 | Performed by: OBSTETRICS & GYNECOLOGY

## 2021-02-11 NOTE — LETTER
"2/11/2021       RE: Sherry George  [address] [facility] [address]     Dear Colleague,    Thank you for referring your patient, Sherry George, to the [facility] [place] at [facility]. Please see a copy of my visit note below.    The patient has been notified of the following:      \"We have found that certain health care needs can be provided without the need for a face to face visit.  This service lets us provide the care you need with a phone conversation.       I will have full access to your [facility] medical record during this entire phone call.   I will be taking notes for your medical record.      Since this is like an office visit, we will bill your insurance company for this service.       There are potential benefits and risks of telephone visits (e.g. limits to patient confidentiality) that differ from in-person visits.?  Confidentiality still applies for telephone services, and nobody will record the visit.  It is important to be in a quiet, private space that is free of distractions (including cell phone or other devices) during the visit.??      If during the course of the call I believe a telephone visit is not appropriate, you will not be charged for this service\"     Consent has been obtained for this service by care team member: Yes       Elmo is a 34 yo  who is referred from [facility] for new patient consultation for laparoscopy and possible hysteroscopy given plan for IVF in the setting of history of ectopic pregnancy.  Patient underwent an IVF cycle in [place] in 2020.  She moved to [place] where in the end of July, 2020 she was diagnosed with an ectopic pregnancy.  She had surgical management on 7/29/2020 and was found to have a dilated right fallopian tube and severe adhesions between bowel and posterior uterus, obliterating the posterior cul-de-sac.  The left fallopian tube had an endometrioma.  Both " "fallopian tubes were removed during surgery.  Following surgery the pathology report showed implantation site in the left fallopian tube, but no chorionic villi.  The right tube was found to have hemorrhage and endometriosis, and no chorionic villi.  (It is a bit confusing because in the path description of the right tube it describes an \"extensively dilated lumen. Within the lumen is possible villous tissue.\")    Her beta hcg on date of surgery was 876, and dropped to 471 the following day.  Her levels were followed, but plateaued at 10 and even ted to 13 on 8/14/20.  She was treated with methotrexate on 8/11/2020 and her beta hcg decrease to <1 on 8/27/2020.  She is Rh negative and did received rhogam.    She has continued to pursue infertility treatment at [facility] with Dr. Mchugh.  Her initial ultrasound at their office showed an hypoechoic vascular right adnexal mass measuring 27.7 x 22.0 mm, which appears to have a vascular stalk to the right ovary.  The uterus was retroflexed and had multiple intramural fibroids.  Both ovaries appeared adhered to the uterus.  The left ovarian has a endometrioma measuring 23.8 x 24.6 mm and the right ovary has an endometrioma measuring 21.4 x 17.9 mm.    Follow up ultrasound on 11/30/2020 showed resolution of the left ovarian cysts and 2 small complex cysts in the right ovary (each about 1cm).  The complex vascular mass in the right adnexa measuring 29.4 x 23.4 mm was still present.    Hysteroscopy done by Dr. Patino noted \"the right cornua has moderate adhesions overlapping the ostium.  The fundal area has a convex and irregular contour concerning for a possible submucosal fibroid versus and adenomyotic implant.  The endometrium is diffusely erythematous.\"     The patient is referred for surgical management of this vascular mass in the right adnexa.  They are concerned that this may represent residual tube or less likely residual ectopic (given hcg is now <1).  They do not want to " proceed with further IVF cycles until this mass is evaluated and hopefully removed.  Additionally they recommend hysteroscopy to lyse adhesions in the right cornual region and remove what is possible of the fundal fibroid.  Given her adhesions noted at her ectopic surgery her primary OB/GYN referred her here for management.      The patient states she is currently doing well.  She continues to take birth control pills.  She is aware of recommendation by Dr. Mchugh to evaluate this right adnexal mass.  She prefers to do the hysteroscopy if necessary at the same time.    Past Medical History:   Diagnosis Date     Endometriosis      Intramural leiomyoma of uterus      Other ectopic pregnancy without intrauterine pregnancy 08/11/2020     Past Surgical History:   Procedure Laterality Date     LAPAROSCOPIC EVACUATION ECTOPIC PREGNANCY Right 7/29/2020    Procedure: pelviscopy, right salpingectomy;  Surgeon: Kimberly Magana MD;  Location: [facility] OR     SALPINGECTOMY Bilateral 7/29/2020    Procedure: PELVISCOPY, BILATERAL SALPINGECTOMY, LYSIS OF ADHESIONS;  Surgeon: Kimberly Magana MD;  Location: [facility] OR     Family History   Problem Relation Age of Onset     Family History Negative Mother      Hyperlipidemia Father      Family History Negative Sister      Breast Cancer No family hx of      Ovarian Cancer No family hx of      Colon Cancer No family hx of      Fibroids No family hx of      Endometriosis No family hx of      Physical exam:  None done for this phone encounter.  Prior vitals at outside clinic: 120/75, BMI 22.35    Assessment/Plan  34 yo  with history of ectopic pregnancy desires laparoscopic assessment of right adnexal mass prior to continued fertility treatments  Intrauterine adhesions and possible submucosal fibroid    - reviewed patient's recent GYN history as above and ultrasound findings.  Discussed that right adnexal mass could represent an pedunculated fibroid vs endometrioma vs other ovarian cyst vs scar  from prior ectopic.  It is unlikely to be ongoing ectopic pregnancy given HCG levels and time since surgery.  Reviewed this case with Dr. Mchugh and their concerned about the vascularity in this area which precludes them from proceeding with additional cycles of IVF.    - Discussed with Elmo that given findings at ectopic surgery this surgery has risks which are significant for damage to surrounding structures.  This may require conversion to an open surgery (laparotomy) or abandonment of procedure without clear identification of this mass.  She voices understanding of this and still desires to proceed.  Will plan on using robotic assistance for management of these dense adhesions.    - Reviewed findings on hysteroscopy with patient and Dr. Mchugh.  Recommend lysis of adhesions and removal of submucosal fundal mass.  Patient agrees to this plan.    - request for surgery placed and patient aware will need pre-op physical with PCP prior to surgery.  All questions answered.    On the day of this encounter at least 60 minutes of time was spent in consultation with telephone discussion, review of historical information, documentation and post visit activities including communication with other providers and coordination of patient care.      Eileen Nelson MD

## 2021-02-11 NOTE — PROGRESS NOTES
"The patient has been notified of the following:      \"We have found that certain health care needs can be provided without the need for a face to face visit.  This service lets us provide the care you need with a phone conversation.       I will have full access to your [facility] medical record during this entire phone call.   I will be taking notes for your medical record.      Since this is like an office visit, we will bill your insurance company for this service.       There are potential benefits and risks of telephone visits (e.g. limits to patient confidentiality) that differ from in-person visits.?  Confidentiality still applies for telephone services, and nobody will record the visit.  It is important to be in a quiet, private space that is free of distractions (including cell phone or other devices) during the visit.??      If during the course of the call I believe a telephone visit is not appropriate, you will not be charged for this service\"     Consent has been obtained for this service by care team member: Yes       Elmo is a 36 yo  who is referred from [facility] for new patient consultation for laparoscopy and possible hysteroscopy given plan for IVF in the setting of history of ectopic pregnancy.  Patient underwent an IVF cycle in [place] in 2020.  She moved to [place] where in the end of July, 2020 she was diagnosed with an ectopic pregnancy.  She had surgical management on 7/29/2020 and was found to have a dilated right fallopian tube and severe adhesions between bowel and posterior uterus, obliterating the posterior cul-de-sac.  The left fallopian tube had an endometrioma.  Both fallopian tubes were removed during surgery.  Following surgery the pathology report showed implantation site in the left fallopian tube, but no chorionic villi.  The right tube was found to have hemorrhage and endometriosis, and no chorionic villi.  (It is a bit confusing because in the path description of the " "right tube it describes an \"extensively dilated lumen. Within the lumen is possible villous tissue.\")    Her beta hcg on date of surgery was 876, and dropped to 471 the following day.  Her levels were followed, but plateaued at 10 and even ted to 13 on 8/14/20.  She was treated with methotrexate on 8/11/2020 and her beta hcg decrease to <1 on 8/27/2020.  She is Rh negative and did received rhogam.    She has continued to pursue infertility treatment at [facility] with Dr. Mchugh.  Her initial ultrasound at their office showed an hypoechoic vascular right adnexal mass measuring 27.7 x 22.0 mm, which appears to have a vascular stalk to the right ovary.  The uterus was retroflexed and had multiple intramural fibroids.  Both ovaries appeared adhered to the uterus.  The left ovarian has a endometrioma measuring 23.8 x 24.6 mm and the right ovary has an endometrioma measuring 21.4 x 17.9 mm.    Follow up ultrasound on 11/30/2020 showed resolution of the left ovarian cysts and 2 small complex cysts in the right ovary (each about 1cm).  The complex vascular mass in the right adnexa measuring 29.4 x 23.4 mm was still present.    Hysteroscopy done by Dr. Patino noted \"the right cornua has moderate adhesions overlapping the ostium.  The fundal area has a convex and irregular contour concerning for a possible submucosal fibroid versus and adenomyotic implant.  The endometrium is diffusely erythematous.\"     The patient is referred for surgical management of this vascular mass in the right adnexa.  They are concerned that this may represent residual tube or less likely residual ectopic (given hcg is now <1).  They do not want to proceed with further IVF cycles until this mass is evaluated and hopefully removed.  Additionally they recommend hysteroscopy to lyse adhesions in the right cornual region and remove what is possible of the fundal fibroid.  Given her adhesions noted at her ectopic surgery her primary OB/GYN referred her here for " management.      The patient states she is currently doing well.  She continues to take birth control pills.  She is aware of recommendation by Dr. Mchugh to evaluate this right adnexal mass.  She prefers to do the hysteroscopy if necessary at the same time.    Past Medical History:   Diagnosis Date     Endometriosis      Intramural leiomyoma of uterus      Other ectopic pregnancy without intrauterine pregnancy 08/11/2020     Past Surgical History:   Procedure Laterality Date     LAPAROSCOPIC EVACUATION ECTOPIC PREGNANCY Right 7/29/2020    Procedure: pelviscopy, right salpingectomy;  Surgeon: Kimberly Magana MD;  Location: [facility] OR     SALPINGECTOMY Bilateral 7/29/2020    Procedure: PELVISCOPY, BILATERAL SALPINGECTOMY, LYSIS OF ADHESIONS;  Surgeon: Kimberly Magana MD;  Location: [facility] OR     Family History   Problem Relation Age of Onset     Family History Negative Mother      Hyperlipidemia Father      Family History Negative Sister      Breast Cancer No family hx of      Ovarian Cancer No family hx of      Colon Cancer No family hx of      Fibroids No family hx of      Endometriosis No family hx of      Physical exam:  None done for this phone encounter.  Prior vitals at outside clinic: 120/75, BMI 22.35    Assessment/Plan  36 yo  with history of ectopic pregnancy desires laparoscopic assessment of right adnexal mass prior to continued fertility treatments  Intrauterine adhesions and possible submucosal fibroid    - reviewed patient's recent GYN history as above and ultrasound findings.  Discussed that right adnexal mass could represent an pedunculated fibroid vs endometrioma vs other ovarian cyst vs scar from prior ectopic.  It is unlikely to be ongoing ectopic pregnancy given HCG levels and time since surgery.  Reviewed this case with Dr. Mchugh and their concerned about the vascularity in this area which precludes them from proceeding with additional cycles of IVF.    - Discussed with Elmo that given  findings at ectopic surgery this surgery has risks which are significant for damage to surrounding structures.  This may require conversion to an open surgery (laparotomy) or abandonment of procedure without clear identification of this mass.  She voices understanding of this and still desires to proceed.  Will plan on using robotic assistance for management of these dense adhesions.    - Reviewed findings on hysteroscopy with patient and Dr. Mchugh.  Recommend lysis of adhesions and removal of submucosal fundal mass.  Patient agrees to this plan.    - request for surgery placed and patient aware will need pre-op physical with PCP prior to surgery.  All questions answered.    On the day of this encounter at least 60 minutes of time was spent in consultation with telephone discussion, review of historical information, documentation and post visit activities including communication with other providers and coordination of patient care.

## 2021-02-18 RX ORDER — CEFAZOLIN SODIUM 2 G/100ML
2 INJECTION, SOLUTION INTRAVENOUS
Status: CANCELLED | OUTPATIENT
Start: 2021-02-18

## 2021-02-18 RX ORDER — CEFAZOLIN SODIUM 2 G/100ML
2 INJECTION, SOLUTION INTRAVENOUS SEE ADMIN INSTRUCTIONS
Status: CANCELLED | OUTPATIENT
Start: 2021-02-18

## 2021-02-18 RX ORDER — ACETAMINOPHEN 325 MG/1
975 TABLET ORAL ONCE
Status: CANCELLED | OUTPATIENT
Start: 2021-02-18 | End: 2021-02-18

## 2021-02-24 ENCOUNTER — TELEPHONE (OUTPATIENT)
Dept: OBGYN | Facility: CLINIC | Age: 36
End: 2021-02-24

## 2021-02-24 PROBLEM — Z87.59 HISTORY OF ECTOPIC PREGNANCY: Status: ACTIVE | Noted: 2021-02-24

## 2021-02-24 PROBLEM — D25.0 SUBMUCOUS LEIOMYOMA OF UTERUS: Status: ACTIVE | Noted: 2021-02-24

## 2021-02-24 NOTE — TELEPHONE ENCOUNTER
Confirmed surgery date, time and location, 5/28/21, arrival time at 6:30a.m with nothing to eat eight hours before scheduled surgery time, clear liquids up to two hours before scheduled surgery time, h&p required 30 days before scheduled surgery time, COVID testing 96 hours prior to surgery, nurse will call to arrange, map and letter mailed out.     to complete the following fields:            CHECKLIST     Google Calendar : Yes     Resident notified: Not Applicable     Clinic schedule blocked:  Not Applicable    Patient notified:Yes      Pre op information sent: Yes     Given to patient over the phone.Yes    Comments:

## 2021-03-03 ENCOUNTER — TELEPHONE (OUTPATIENT)
Dept: OBGYN | Facility: CLINIC | Age: 36
End: 2021-03-03

## 2021-03-03 DIAGNOSIS — Z20.822 COVID-19 RULED OUT: Primary | ICD-10-CM

## 2021-03-16 DIAGNOSIS — Z20.822 COVID-19 RULED OUT: ICD-10-CM

## 2021-03-16 LAB
SARS-COV-2 RNA RESP QL NAA+PROBE: NORMAL
SPECIMEN SOURCE: NORMAL

## 2021-03-16 PROCEDURE — U0005 INFEC AGEN DETEC AMPLI PROBE: HCPCS | Performed by: OBSTETRICS & GYNECOLOGY

## 2021-03-16 PROCEDURE — U0003 INFECTIOUS AGENT DETECTION BY NUCLEIC ACID (DNA OR RNA); SEVERE ACUTE RESPIRATORY SYNDROME CORONAVIRUS 2 (SARS-COV-2) (CORONAVIRUS DISEASE [COVID-19]), AMPLIFIED PROBE TECHNIQUE, MAKING USE OF HIGH THROUGHPUT TECHNOLOGIES AS DESCRIBED BY CMS-2020-01-R: HCPCS | Performed by: OBSTETRICS & GYNECOLOGY

## 2021-03-17 LAB
LABORATORY COMMENT REPORT: NORMAL
SARS-COV-2 RNA RESP QL NAA+PROBE: NEGATIVE
SPECIMEN SOURCE: NORMAL

## 2021-03-18 ENCOUNTER — ANESTHESIA EVENT (OUTPATIENT)
Dept: SURGERY | Facility: CLINIC | Age: 36
End: 2021-03-18
Payer: COMMERCIAL

## 2021-03-19 ENCOUNTER — ANESTHESIA (OUTPATIENT)
Dept: SURGERY | Facility: CLINIC | Age: 36
End: 2021-03-19
Payer: COMMERCIAL

## 2021-03-19 ENCOUNTER — HOSPITAL ENCOUNTER (OUTPATIENT)
Facility: CLINIC | Age: 36
Discharge: HOME | End: 2021-03-19
Attending: OBSTETRICS & GYNECOLOGY | Admitting: OBSTETRICS & GYNECOLOGY
Payer: COMMERCIAL

## 2021-03-19 VITALS
SYSTOLIC BLOOD PRESSURE: 121 MMHG | OXYGEN SATURATION: 99 % | HEART RATE: 77 BPM | HEIGHT: 64 IN | TEMPERATURE: 97.5 F | RESPIRATION RATE: 18 BRPM | DIASTOLIC BLOOD PRESSURE: 84 MMHG | BODY MASS INDEX: 22.88 KG/M2 | WEIGHT: 134.04 LBS

## 2021-03-19 DIAGNOSIS — Z98.890 S/P LAPAROSCOPY: Primary | ICD-10-CM

## 2021-03-19 DIAGNOSIS — D25.0 SUBMUCOUS LEIOMYOMA OF UTERUS: ICD-10-CM

## 2021-03-19 DIAGNOSIS — Z87.59 HISTORY OF ECTOPIC PREGNANCY: ICD-10-CM

## 2021-03-19 LAB
ABO + RH BLD: NORMAL
ABO + RH BLD: NORMAL
BASOPHILS # BLD AUTO: 0.1 10E9/L (ref 0–0.2)
BASOPHILS NFR BLD AUTO: 0.7 %
BLD GP AB SCN SERPL QL: NORMAL
BLOOD BANK CMNT PATIENT-IMP: NORMAL
DIFFERENTIAL METHOD BLD: ABNORMAL
EOSINOPHIL # BLD AUTO: 0.5 10E9/L (ref 0–0.7)
EOSINOPHIL NFR BLD AUTO: 5.7 %
ERYTHROCYTE [DISTWIDTH] IN BLOOD BY AUTOMATED COUNT: 14.6 % (ref 10–15)
GLUCOSE BLDC GLUCOMTR-MCNC: 96 MG/DL (ref 70–99)
HCG SERPL QL: NEGATIVE
HCT VFR BLD AUTO: 40.6 % (ref 35–47)
HGB BLD-MCNC: 13.6 G/DL (ref 11.7–15.7)
IMM GRANULOCYTES # BLD: 0 10E9/L (ref 0–0.4)
IMM GRANULOCYTES NFR BLD: 0.1 %
LYMPHOCYTES # BLD AUTO: 4.8 10E9/L (ref 0.8–5.3)
LYMPHOCYTES NFR BLD AUTO: 54.8 %
MCH RBC QN AUTO: 28.5 PG (ref 26.5–33)
MCHC RBC AUTO-ENTMCNC: 33.5 G/DL (ref 31.5–36.5)
MCV RBC AUTO: 85 FL (ref 78–100)
MONOCYTES # BLD AUTO: 0.6 10E9/L (ref 0–1.3)
MONOCYTES NFR BLD AUTO: 6.3 %
NEUTROPHILS # BLD AUTO: 2.8 10E9/L (ref 1.6–8.3)
NEUTROPHILS NFR BLD AUTO: 32.4 %
NRBC # BLD AUTO: 0 10*3/UL
NRBC BLD AUTO-RTO: 0 /100
PLATELET # BLD AUTO: 467 10E9/L (ref 150–450)
RBC # BLD AUTO: 4.77 10E12/L (ref 3.8–5.2)
SPECIMEN EXP DATE BLD: NORMAL
WBC # BLD AUTO: 8.7 10E9/L (ref 4–11)

## 2021-03-19 PROCEDURE — 250N000011 HC RX IP 250 OP 636: Performed by: ANESTHESIOLOGY

## 2021-03-19 PROCEDURE — 88304 TISSUE EXAM BY PATHOLOGIST: CPT | Mod: TC | Performed by: OBSTETRICS & GYNECOLOGY

## 2021-03-19 PROCEDURE — 250N000011 HC RX IP 250 OP 636: Performed by: NURSE ANESTHETIST, CERTIFIED REGISTERED

## 2021-03-19 PROCEDURE — 250N000011 HC RX IP 250 OP 636: Performed by: OBSTETRICS & GYNECOLOGY

## 2021-03-19 PROCEDURE — 258N000003 HC RX IP 258 OP 636: Performed by: ANESTHESIOLOGY

## 2021-03-19 PROCEDURE — 88305 TISSUE EXAM BY PATHOLOGIST: CPT | Mod: 26 | Performed by: PATHOLOGY

## 2021-03-19 PROCEDURE — 710N000012 HC RECOVERY PHASE 2, PER MINUTE: Performed by: OBSTETRICS & GYNECOLOGY

## 2021-03-19 PROCEDURE — 250N000009 HC RX 250: Performed by: ANESTHESIOLOGY

## 2021-03-19 PROCEDURE — 82962 GLUCOSE BLOOD TEST: CPT

## 2021-03-19 PROCEDURE — 88305 TISSUE EXAM BY PATHOLOGIST: CPT | Mod: TC | Performed by: OBSTETRICS & GYNECOLOGY

## 2021-03-19 PROCEDURE — 86901 BLOOD TYPING SEROLOGIC RH(D): CPT | Performed by: OBSTETRICS & GYNECOLOGY

## 2021-03-19 PROCEDURE — 36415 COLL VENOUS BLD VENIPUNCTURE: CPT | Performed by: OBSTETRICS & GYNECOLOGY

## 2021-03-19 PROCEDURE — 86900 BLOOD TYPING SEROLOGIC ABO: CPT | Performed by: OBSTETRICS & GYNECOLOGY

## 2021-03-19 PROCEDURE — 250N000025 HC SEVOFLURANE, PER MIN: Performed by: OBSTETRICS & GYNECOLOGY

## 2021-03-19 PROCEDURE — 84703 CHORIONIC GONADOTROPIN ASSAY: CPT | Performed by: OBSTETRICS & GYNECOLOGY

## 2021-03-19 PROCEDURE — 710N000010 HC RECOVERY PHASE 1, LEVEL 2, PER MIN: Performed by: OBSTETRICS & GYNECOLOGY

## 2021-03-19 PROCEDURE — 999N000141 HC STATISTIC PRE-PROCEDURE NURSING ASSESSMENT: Performed by: OBSTETRICS & GYNECOLOGY

## 2021-03-19 PROCEDURE — 250N000013 HC RX MED GY IP 250 OP 250 PS 637: Performed by: OBSTETRICS & GYNECOLOGY

## 2021-03-19 PROCEDURE — 88304 TISSUE EXAM BY PATHOLOGIST: CPT | Mod: 26 | Performed by: PATHOLOGY

## 2021-03-19 PROCEDURE — 250N000009 HC RX 250: Performed by: NURSE ANESTHETIST, CERTIFIED REGISTERED

## 2021-03-19 PROCEDURE — 250N000013 HC RX MED GY IP 250 OP 250 PS 637: Performed by: STUDENT IN AN ORGANIZED HEALTH CARE EDUCATION/TRAINING PROGRAM

## 2021-03-19 PROCEDURE — 360N000080 HC SURGERY LEVEL 7, PER MIN: Performed by: OBSTETRICS & GYNECOLOGY

## 2021-03-19 PROCEDURE — 272N000001 HC OR GENERAL SUPPLY STERILE: Performed by: OBSTETRICS & GYNECOLOGY

## 2021-03-19 PROCEDURE — 258N000003 HC RX IP 258 OP 636: Performed by: NURSE ANESTHETIST, CERTIFIED REGISTERED

## 2021-03-19 PROCEDURE — 86850 RBC ANTIBODY SCREEN: CPT | Performed by: OBSTETRICS & GYNECOLOGY

## 2021-03-19 PROCEDURE — 85025 COMPLETE CBC W/AUTO DIFF WBC: CPT | Performed by: OBSTETRICS & GYNECOLOGY

## 2021-03-19 PROCEDURE — 370N000017 HC ANESTHESIA TECHNICAL FEE, PER MIN: Performed by: OBSTETRICS & GYNECOLOGY

## 2021-03-19 RX ORDER — ONDANSETRON 4 MG/1
4 TABLET, ORALLY DISINTEGRATING ORAL EVERY 30 MIN PRN
Status: DISCONTINUED | OUTPATIENT
Start: 2021-03-19 | End: 2021-03-19 | Stop reason: HOSPADM

## 2021-03-19 RX ORDER — FENTANYL CITRATE 50 UG/ML
25-50 INJECTION, SOLUTION INTRAMUSCULAR; INTRAVENOUS
Status: DISCONTINUED | OUTPATIENT
Start: 2021-03-19 | End: 2021-03-19 | Stop reason: HOSPADM

## 2021-03-19 RX ORDER — OXYCODONE HYDROCHLORIDE 5 MG/1
5 TABLET ORAL EVERY 6 HOURS PRN
Qty: 6 TABLET | Refills: 0 | Status: SHIPPED | OUTPATIENT
Start: 2021-03-19 | End: 2021-03-22

## 2021-03-19 RX ORDER — FENTANYL CITRATE 50 UG/ML
INJECTION, SOLUTION INTRAMUSCULAR; INTRAVENOUS PRN
Status: DISCONTINUED | OUTPATIENT
Start: 2021-03-19 | End: 2021-03-19

## 2021-03-19 RX ORDER — ONDANSETRON 2 MG/ML
INJECTION INTRAMUSCULAR; INTRAVENOUS PRN
Status: DISCONTINUED | OUTPATIENT
Start: 2021-03-19 | End: 2021-03-19

## 2021-03-19 RX ORDER — CEFAZOLIN SODIUM 2 G/100ML
2 INJECTION, SOLUTION INTRAVENOUS
Status: COMPLETED | OUTPATIENT
Start: 2021-03-19 | End: 2021-03-19

## 2021-03-19 RX ORDER — AMOXICILLIN 250 MG
1-2 CAPSULE ORAL 2 TIMES DAILY
Qty: 30 TABLET | Refills: 0 | Status: SHIPPED | OUTPATIENT
Start: 2021-03-19 | End: 2021-10-26

## 2021-03-19 RX ORDER — BUPIVACAINE HYDROCHLORIDE AND EPINEPHRINE 2.5; 5 MG/ML; UG/ML
INJECTION, SOLUTION INFILTRATION; PERINEURAL PRN
Status: DISCONTINUED | OUTPATIENT
Start: 2021-03-19 | End: 2021-03-19

## 2021-03-19 RX ORDER — SODIUM CHLORIDE, SODIUM LACTATE, POTASSIUM CHLORIDE, CALCIUM CHLORIDE 600; 310; 30; 20 MG/100ML; MG/100ML; MG/100ML; MG/100ML
INJECTION, SOLUTION INTRAVENOUS CONTINUOUS
Status: DISCONTINUED | OUTPATIENT
Start: 2021-03-19 | End: 2021-03-19 | Stop reason: HOSPADM

## 2021-03-19 RX ORDER — LIDOCAINE HYDROCHLORIDE 20 MG/ML
INJECTION, SOLUTION INFILTRATION; PERINEURAL PRN
Status: DISCONTINUED | OUTPATIENT
Start: 2021-03-19 | End: 2021-03-19

## 2021-03-19 RX ORDER — ACETAMINOPHEN 325 MG/1
975 TABLET ORAL ONCE
Status: COMPLETED | OUTPATIENT
Start: 2021-03-19 | End: 2021-03-19

## 2021-03-19 RX ORDER — ACETAMINOPHEN 325 MG/1
975 TABLET ORAL EVERY 6 HOURS PRN
Qty: 50 TABLET | Refills: 0 | Status: SHIPPED | OUTPATIENT
Start: 2021-03-19

## 2021-03-19 RX ORDER — CEFAZOLIN SODIUM 1 G/3ML
INJECTION, POWDER, FOR SOLUTION INTRAMUSCULAR; INTRAVENOUS PRN
Status: DISCONTINUED | OUTPATIENT
Start: 2021-03-19 | End: 2021-03-19

## 2021-03-19 RX ORDER — NALOXONE HYDROCHLORIDE 0.4 MG/ML
0.4 INJECTION, SOLUTION INTRAMUSCULAR; INTRAVENOUS; SUBCUTANEOUS
Status: DISCONTINUED | OUTPATIENT
Start: 2021-03-19 | End: 2021-03-19 | Stop reason: HOSPADM

## 2021-03-19 RX ORDER — CEFAZOLIN SODIUM 2 G/100ML
2 INJECTION, SOLUTION INTRAVENOUS SEE ADMIN INSTRUCTIONS
Status: DISCONTINUED | OUTPATIENT
Start: 2021-03-19 | End: 2021-03-19 | Stop reason: HOSPADM

## 2021-03-19 RX ORDER — DEXAMETHASONE SODIUM PHOSPHATE 10 MG/ML
INJECTION, SOLUTION INTRAMUSCULAR; INTRAVENOUS PRN
Status: DISCONTINUED | OUTPATIENT
Start: 2021-03-19 | End: 2021-03-19

## 2021-03-19 RX ORDER — PROPOFOL 10 MG/ML
INJECTION, EMULSION INTRAVENOUS PRN
Status: DISCONTINUED | OUTPATIENT
Start: 2021-03-19 | End: 2021-03-19

## 2021-03-19 RX ORDER — DROSPIRENONE AND ETHINYL ESTRADIOL 0.02-3(28)
1 KIT ORAL DAILY
Qty: 84 TABLET | Refills: 3 | Status: SHIPPED | OUTPATIENT
Start: 2021-03-19 | End: 2021-11-08

## 2021-03-19 RX ORDER — ACETAMINOPHEN 325 MG/1
975 TABLET ORAL ONCE
Status: DISCONTINUED | OUTPATIENT
Start: 2021-03-19 | End: 2021-03-19 | Stop reason: HOSPADM

## 2021-03-19 RX ORDER — DEXAMETHASONE SODIUM PHOSPHATE 4 MG/ML
INJECTION, SOLUTION INTRA-ARTICULAR; INTRALESIONAL; INTRAMUSCULAR; INTRAVENOUS; SOFT TISSUE PRN
Status: DISCONTINUED | OUTPATIENT
Start: 2021-03-19 | End: 2021-03-19

## 2021-03-19 RX ORDER — SODIUM CHLORIDE, SODIUM LACTATE, POTASSIUM CHLORIDE, CALCIUM CHLORIDE 600; 310; 30; 20 MG/100ML; MG/100ML; MG/100ML; MG/100ML
INJECTION, SOLUTION INTRAVENOUS CONTINUOUS PRN
Status: DISCONTINUED | OUTPATIENT
Start: 2021-03-19 | End: 2021-03-19

## 2021-03-19 RX ORDER — MAGNESIUM SULFATE HEPTAHYDRATE 40 MG/ML
INJECTION, SOLUTION INTRAVENOUS PRN
Status: DISCONTINUED | OUTPATIENT
Start: 2021-03-19 | End: 2021-03-19

## 2021-03-19 RX ORDER — NALOXONE HYDROCHLORIDE 0.4 MG/ML
0.2 INJECTION, SOLUTION INTRAMUSCULAR; INTRAVENOUS; SUBCUTANEOUS
Status: DISCONTINUED | OUTPATIENT
Start: 2021-03-19 | End: 2021-03-19 | Stop reason: HOSPADM

## 2021-03-19 RX ORDER — OXYCODONE HYDROCHLORIDE 5 MG/1
5 TABLET ORAL
Status: DISCONTINUED | OUTPATIENT
Start: 2021-03-19 | End: 2021-03-19 | Stop reason: HOSPADM

## 2021-03-19 RX ORDER — IBUPROFEN 800 MG/1
800 TABLET, FILM COATED ORAL EVERY 6 HOURS PRN
Qty: 30 TABLET | Refills: 0 | Status: SHIPPED | OUTPATIENT
Start: 2021-03-19 | End: 2022-04-14

## 2021-03-19 RX ORDER — KETOROLAC TROMETHAMINE 30 MG/ML
INJECTION, SOLUTION INTRAMUSCULAR; INTRAVENOUS PRN
Status: DISCONTINUED | OUTPATIENT
Start: 2021-03-19 | End: 2021-03-19

## 2021-03-19 RX ORDER — KETAMINE HYDROCHLORIDE 10 MG/ML
INJECTION INTRAMUSCULAR; INTRAVENOUS PRN
Status: DISCONTINUED | OUTPATIENT
Start: 2021-03-19 | End: 2021-03-19

## 2021-03-19 RX ORDER — ONDANSETRON 2 MG/ML
4 INJECTION INTRAMUSCULAR; INTRAVENOUS EVERY 30 MIN PRN
Status: DISCONTINUED | OUTPATIENT
Start: 2021-03-19 | End: 2021-03-19 | Stop reason: HOSPADM

## 2021-03-19 RX ORDER — MEPERIDINE HYDROCHLORIDE 25 MG/ML
12.5 INJECTION INTRAMUSCULAR; INTRAVENOUS; SUBCUTANEOUS
Status: DISCONTINUED | OUTPATIENT
Start: 2021-03-19 | End: 2021-03-19 | Stop reason: HOSPADM

## 2021-03-19 RX ORDER — ONDANSETRON 4 MG/1
4-8 TABLET, ORALLY DISINTEGRATING ORAL EVERY 8 HOURS PRN
Qty: 4 TABLET | Refills: 0 | Status: SHIPPED | OUTPATIENT
Start: 2021-03-19 | End: 2021-10-26

## 2021-03-19 RX ADMIN — SODIUM CHLORIDE, POTASSIUM CHLORIDE, SODIUM LACTATE AND CALCIUM CHLORIDE: 600; 310; 30; 20 INJECTION, SOLUTION INTRAVENOUS at 10:22

## 2021-03-19 RX ADMIN — Medication 5 MG: at 09:00

## 2021-03-19 RX ADMIN — SUGAMMADEX 130 MG: 100 INJECTION, SOLUTION INTRAVENOUS at 10:37

## 2021-03-19 RX ADMIN — MAGNESIUM SULFATE HEPTAHYDRATE 2 G: 40 INJECTION, SOLUTION INTRAVENOUS at 07:55

## 2021-03-19 RX ADMIN — DEXMEDETOMIDINE HYDROCHLORIDE 40 MCG: 100 INJECTION, SOLUTION INTRAVENOUS at 10:39

## 2021-03-19 RX ADMIN — MIDAZOLAM 2 MG: 1 INJECTION INTRAMUSCULAR; INTRAVENOUS at 07:25

## 2021-03-19 RX ADMIN — ONDANSETRON 4 MG: 2 INJECTION INTRAMUSCULAR; INTRAVENOUS at 10:02

## 2021-03-19 RX ADMIN — PROPOFOL 200 MG: 10 INJECTION, EMULSION INTRAVENOUS at 07:37

## 2021-03-19 RX ADMIN — CEFAZOLIN 1 G: 1 INJECTION, POWDER, FOR SOLUTION INTRAMUSCULAR; INTRAVENOUS at 09:48

## 2021-03-19 RX ADMIN — CEFAZOLIN 2 G: 10 INJECTION, POWDER, FOR SOLUTION INTRAVENOUS at 07:50

## 2021-03-19 RX ADMIN — ROCURONIUM BROMIDE 10 MG: 10 INJECTION INTRAVENOUS at 08:03

## 2021-03-19 RX ADMIN — KETOROLAC TROMETHAMINE 30 MG: 30 INJECTION, SOLUTION INTRAMUSCULAR at 10:27

## 2021-03-19 RX ADMIN — SODIUM CHLORIDE, POTASSIUM CHLORIDE, SODIUM LACTATE AND CALCIUM CHLORIDE: 600; 310; 30; 20 INJECTION, SOLUTION INTRAVENOUS at 07:25

## 2021-03-19 RX ADMIN — ROCURONIUM BROMIDE 50 MG: 10 INJECTION INTRAVENOUS at 07:37

## 2021-03-19 RX ADMIN — ACETAMINOPHEN 975 MG: 325 TABLET, FILM COATED ORAL at 12:45

## 2021-03-19 RX ADMIN — ROCURONIUM BROMIDE 10 MG: 10 INJECTION INTRAVENOUS at 09:01

## 2021-03-19 RX ADMIN — ACETAMINOPHEN 975 MG: 325 TABLET, FILM COATED ORAL at 06:25

## 2021-03-19 RX ADMIN — BUPIVACAINE HYDROCHLORIDE AND EPINEPHRINE BITARTRATE 60 ML: 2.5; .005 INJECTION, SOLUTION INFILTRATION; PERINEURAL at 10:39

## 2021-03-19 RX ADMIN — Medication 5 MG: at 10:06

## 2021-03-19 RX ADMIN — Medication 20 MG: at 08:03

## 2021-03-19 RX ADMIN — LIDOCAINE HYDROCHLORIDE 100 MG: 20 INJECTION, SOLUTION INFILTRATION; PERINEURAL at 07:37

## 2021-03-19 RX ADMIN — FENTANYL CITRATE 50 MCG: 50 INJECTION, SOLUTION INTRAMUSCULAR; INTRAVENOUS at 07:37

## 2021-03-19 RX ADMIN — HYDROMORPHONE HYDROCHLORIDE 0.5 MG: 1 INJECTION, SOLUTION INTRAMUSCULAR; INTRAVENOUS; SUBCUTANEOUS at 08:20

## 2021-03-19 RX ADMIN — DEXAMETHASONE SODIUM PHOSPHATE 2 MG: 10 INJECTION, SOLUTION INTRAMUSCULAR; INTRAVENOUS at 10:39

## 2021-03-19 RX ADMIN — DEXAMETHASONE SODIUM PHOSPHATE 6 MG: 4 INJECTION, SOLUTION INTRAMUSCULAR; INTRAVENOUS at 08:12

## 2021-03-19 ASSESSMENT — MIFFLIN-ST. JEOR: SCORE: 1283

## 2021-03-19 NOTE — DISCHARGE INSTRUCTIONS
Same-Day Surgery   Adult Discharge Orders & Instructions     For 24 hours after surgery:  1. Get plenty of rest.  A responsible adult must stay with you for at least 24 hours after you leave the hospital.   2. Pain medication can slow your reflexes. Do not drive or use heavy equipment.  If you have weakness or tingling, don't drive or use heavy equipment until this feeling goes away.  3. Mixing alcohol and pain medication can cause dizziness and slow your breathing. It can even be fatal. Do not drink alcohol while taking pain medication.  4. Avoid strenuous or risky activities.  Ask for help when climbing stairs.   5. You may feel lightheaded.  If so, sit for a few minutes before standing.  Have someone help you get up.   6. If you have nausea (feel sick to your stomach), drink only clear liquids such as apple juice, ginger ale, broth or 7-Up.  Rest may also help.  Be sure to drink enough fluids.  Move to a regular diet as you feel able. Take pain medications with a small amount of solid food, such as toast or crackers, to avoid nausea.   7. A slight fever is normal. Call the doctor if your fever is over 100 F (37.7 C) (taken under the tongue) or lasts longer than 24 hours.  8. You may have a dry mouth, muscle aches, trouble sleeping or a sore throat.  These symptoms should go away after 24 hours.  9. Do not make important or legal decisions.   Pain Management:      1. Take pain medication (if prescribed) for pain as directed by your physician.        2. WARNING: If the pain medication you have been prescribed contains Tylenol  (acetaminophen), DO NOT take additional doses of Tylenol (acetaminophen).     Call your doctor for any of the followin.  Signs of infection (fever, growing tenderness at the surgery site, severe pain, a large amount of drainage or bleeding, foul-smelling drainage, redness, swelling).    2.  It has been over 8 to 10 hours since surgery and you are still not able to urinate (pee).    3.   "Headache for over 24 hours.    4.  Numbness, tingling or weakness the day after surgery (if you had spinal anesthesia).  To contact a doctor, call _____________________________________ or:      [phone] and ask for the Resident On Call for:          _______OBGYN______________ (answered 24 hours a day)      Emergency Department:  [place] [facility]: [phone]  [place] [facility]: [phone]              Rev. 10/2014       Discharge Instructions:   Following a Laparoscopy    Comfort:    The amount of discomfort you can expect is very unpredictable.     If you have pain that cannot be controlled with non aspirin medication or with the prescription medication you may have received, you should notify your physician.     You May Experience:    Abdominal tenderness; abdominal cramps (like menstrual cramps).    Low back ache or discomfort radiating to your shoulders, chest, back or neck. This is a result of the gas used to inflate your abdomen during surgery. This gas is absorbed in 24 to 36 hours. The \"knee chest\" position will help relieve this discomfort.    Sore throat for a day or two resulting from the anesthesia tube used during surgery. You may use throat lozenges to help relieve this discomfort.    Black and blue marks on your abdomen.    Drainage:    You may expect a small amount of drainage from the incision on your abdomen and you may change the bandage when necessary.    You may also have a small amount of vaginal drainage for 3 to 4 days; this is normal and no cause for concern. If excessive bleeding occurs, notify your physician.    Do not douche, and use a pad rather than tampons. Do not resume intercourse for at least one week or until bleeding has ceased.    Home Activity:    The day of surgery spend a quiet day at home.    Increase activity as tolerated.    You may bathe or shower, do not soak in bath tub or scrub incisions.    You have no restrictions on your diet. " Following surgery, drink plenty of fluids and eat a light meal.    The anesthesia may produce some nausea. If you feel nauseated, stay in bed, keep your head down and try drinking fluids such as Seven-Up, tea or soup.    Notify Physician at Once IF:    You have a fever over 100.4 degrees. A low grade fever (under 100 degrees) is usual after surgery.    You have severe pain.    You have a large amount of bleeding or drainage.    Important numbers  [facility] ([address]) - [place]: [phone]   [facility] ([address]) : [phone]    Rev. 4/2014  Tylenol last at 6:25 AM, next dose of tylenol OK at 12:25 PM    Ibuprofen last at 10:27 AM, next dose of ibuprofen OK at 4:30 PM.

## 2021-03-19 NOTE — OR NURSING
Abdominal distension unchanged. Updated Dr. Aguilar. OK for patient to move to phase 2 and discharge when ready.

## 2021-03-19 NOTE — BRIEF OP NOTE
[facility]    Brief Operative Note    Pre-operative diagnosis: History of ectopic pregnancy [Z87.59]  Submucous leiomyoma of uterus [D25.0]  Post-operative diagnosis Same as pre-operative diagnosis    Procedure: Procedure(s):  LYSIS, ADHESIONS, ROBOT-ASSISTED, REMOVAL OF RIGHT ADNEXAL MASS  HYSTEROSCOPY, WITH DILATION AND CURETTAGE OF UTERUS USING MORCELLATOR, POSSIBLE MYOMECTOMY, POSSIBLE LYSIS OF ADHESIONS  Surgeon: Surgeon(s) and Role:  Panel 1:     * Eileen Nelson MD - Primary     * Ricardo Jensen MD  Panel 2:     * Eileen Nelson MD - Primary  Anesthesia: General   Estimated blood loss: 75cc  Drains: None  Specimens:   ID Type Source Tests Collected by Time Destination   A : RIGHT PARATUBAL CYST Tissue Fallopian Tube, Right SURGICAL PATHOLOGY EXAM Eileen Nelson MD 3/19/2021  9:12 AM    B : MYOMA Tissue Uterus SURGICAL PATHOLOGY EXAM Eileen Nelson MD 3/19/2021  9:13 AM      Findings: Atraumatic laparoscopic entry. Frozen pelvis, obliterated cul-de-sac, although able to see small portion of right posterior cul-de-sac after lysis of adhesions (appeared normal). 2cm pedunculated fibroid at posterior fundus (removed). Multiple small subserosal fibroids. Small right paratubal cyst. Ovaries adhered in posterior cul-de-sac, partially visualized. Surgically absent fallopian tubes, no remnants visible. No evidence on fibroid on hysteroscopy, difficult to visualize ostia, no synechiae.     Complications: None.  Implants: * No implants in log *    Ricardo Jensen MD

## 2021-03-19 NOTE — ANESTHESIA CARE TRANSFER NOTE
Patient: Sherry George    Procedure(s):  LYSIS, ADHESIONS, ROBOT-ASSISTED, REMOVAL OF RIGHT ADNEXAL MASS  HYSTEROSCOPY, WITH DILATION AND CURETTAGE OF UTERUS USING MORCELLATOR, POSSIBLE MYOMECTOMY, POSSIBLE LYSIS OF ADHESIONS    Diagnosis: History of ectopic pregnancy [Z87.59]  Submucous leiomyoma of uterus [D25.0]  Diagnosis Additional Information: No value filed.    Anesthesia Type:   General     Note:    Oropharynx: oropharynx clear of all foreign objects and ventilatory support  Level of Consciousness: drowsy  Oxygen Supplementation: face mask  Level of Supplemental Oxygen (L/min / FiO2): 8  Independent Airway: airway patency satisfactory and stable  Dentition: dentition unchanged  Vital Signs Stable: post-procedure vital signs reviewed and stable  Report to RN Given: handoff report given  Patient transferred to: PACU  Comments: Patient transferred to PACU on simple facemask O2 at 8 LPM. VSS and respiration WNL upon arrival. Report to RN, all questions answered.     Johnna Brooks 10:56 AM March 19, 2021        Handoff Report: Identifed the Patient, Identified the Reponsible Provider, Reviewed the pertinent medical history, Discussed the surgical course, Reviewed Intra-OP anesthesia mangement and issues during anesthesia, Set expectations for post-procedure period and Allowed opportunity for questions and acknowledgement of understanding      Vitals: (Last set prior to Anesthesia Care Transfer)  CRNA VITALS  3/19/2021 1017 - 3/19/2021 1056      3/19/2021             NIBP:  135/80    Pulse:  121    NIBP Mean:  94    Temp:  36.4  C (97.5  F)    SpO2:  100 %    Resp Rate (observed):  16        Electronically Signed By: GRAHAM Gunter CRNA  March 19, 2021  10:56 AM

## 2021-03-19 NOTE — OR NURSING
Jammie Jensen. Notified that patient's abdomen is slightly distended. He came to bedside to assess patient.     No new orders at this time. Will continue to monitor.

## 2021-03-19 NOTE — OP NOTE
[facility]  Operative Note    Name: Sherry George  MRN: [id number]  : 1985  Date of Surgery: 2021    Pre-operative Diagnosis:   - Infertility  - Hx of ectopic pregnancy  - Fibroid uterus    Post-operative Diagnosis:   - Same as above    Procedure(s):   Robotic assisted diagnostic laparoscopy, lysis of adhesions, myomectomy, diagnostic hysteroscopy    Surgeon: Eileen Nelson MD   Assistants: Ricardo Jensen MD, PGY4     Anesthesia: GETA + TAP block postop  EBL: 75  Urine Output: 250cc   Fluids: 1,000cc crystalloid  Specimens: Uterine fibroid, right paratubal cyst  Complications: None  Indications: 34 yo  with history of ectopic pregnancy desires laparoscopic assessment of right adnexal mass, possible intrauterine adhesions, and possible submucosal fibroid prior to continued fertility treatments.  Discussed risks, benefits, alternatives to procedure.  Consent was signed.    Findings: Atraumatic laparoscopic entry. Frozen pelvis, obliterated cul-de-sac, although able to see small portion of right posterior cul-de-sac after lysis of adhesions (appeared normal). 2cm pedunculated fibroid at posterior fundus (removed). Patient IS a candidate for vaginal delivery after removal of this pedunculated myoma. Multiple small subserosal fibroids. Small right paratubal cyst. Ovaries adhered in posterior cul-de-sac, partially visualized. Surgically absent fallopian tubes, no remnants visible. No evidence on fibroid on hysteroscopy, difficult to visualize ostia, no synechiae. Fluid deficit 260cc NS.      Procedure Details: The patient was taken to the operating room where she underwent general anesthesia.  She was positioned in the dorsolithotomy position, and prepped and draped in the usual sterile fashion.  A timeout was performed.  A Peterson catheter was placed, and a sterile speculum was placed into the vagina.  A single-tooth tenaculum was placed on the anterior lip of  the cervix, and the cervix was serially dilated to 7 mm.  A Love uterine manipulator was placed and the tenaculum removed.  Attention was turned to the abdomen where an 8 mm skin incision was made in the umbilicus.  The Veress needle was introduced into the abdomen and CO2 gas connected.  Opening pressure was 2mmHg. After adequate pneumoperitoneum was achieved, an 8 mm camera port was placed.  The da Fred camera was inserted and abdominal survey was performed with findings as above.  2 additional 8 mm ports were placed in the right and left lower quadrants.  A 5 mm assistant port was placed in the left upper quadrant.  The robot was docked and instruments inserted.  Adhesions from the posterior uterine serosa and posterior fundal fibroid were taken down from the underlying bowel with a combination of blunt and sharp dissection.  The posterior cul-de-sac was nearly obliterated, aside from a small window on the right side which did not reveal any pathology.  Bilateral ovaries were adhered in the posterior cul-de-sac.  No identifiable right ovarian lesion was found, but there was a several centimeter posterior pedunculated fibroid which abutted the right ovary, the serosa of this myoma was noted to be bleeding after lysis of adhesions.  The stalk of this fibroid was identified and it was cauterized and cut.  After the fibroid was removed from the uterus, was placed in the 5 mm Endo Catch bag.  The dissection area was suction irrigated, and Surgi-Bib powder was placed for hemostasis.  At this point, the robot was undocked.  The Endo Catch bag was brought to the skin of the 5 mm assistant port.  The port was removed, and the incision was extended by about 7 mm.  At this point, the fibroid was successfully removed from the abdomen.  The fascia of this incision was closed with a running stitch of 0 Vicryl.  An additional figure of X stitch was placed due to an identified defect.  Finally, the peritoneum was closed with a  single stitch of 0 Vicryl using the Trino-Benny needle.  At this point, pneumoperitoneum was expelled and the ports were removed.  A subcutaneous stitch of 3-0 Vicryl was placed in the 12 mm assistant port incision.  The remaining skin incisions were closed with 4 Monocryl and covered with Dermabond.  Attention was turned to the hysteroscopy portion of the case.  The uterine manipulator was removed and a sterile speculum and tenaculum were replaced.  The hysteroscope was inserted into the uterus with findings and fluid deficit described above.  The hysteroscope was removed and the tenaculum was taken off.  Tenaculum sites were noted to be hemostatic with direct pressure.  The Peterson catheter was removed.  The patient tolerated the procedure well.  Sharps, instruments, and sponge count were correct x2.  Dr. Nelson was present and scrubbed for the entire procedure.     Ricardo Jensen MD PGY4  3/19/2021 11:15 AM     I was present and scrubbed throughout the procedure,  I agree with the note above  Eileen Nelson MD

## 2021-03-19 NOTE — ANESTHESIA PREPROCEDURE EVALUATION
Anesthesia Pre-Procedure Evaluation    Patient: Sherry George   MRN: [id number] : 1985        Preoperative Diagnosis: History of ectopic pregnancy [Z87.59]  Submucous leiomyoma of uterus [D25.0]   Procedure : Procedure(s):  LYSIS, ADHESIONS, ROBOT-ASSISTED, REMOVAL OF RIGHT ADNEXAL MASS  HYSTEROSCOPY, WITH DILATION AND CURETTAGE OF UTERUS USING MORCELLATOR, POSSIBLE MYOMECTOMY, POSSIBLE LYSIS OF ADHESIONS     Past Medical History:   Diagnosis Date     Endometriosis      Intramural leiomyoma of uterus      Other ectopic pregnancy without intrauterine pregnancy 2020      Past Surgical History:   Procedure Laterality Date     LAPAROSCOPIC EVACUATION ECTOPIC PREGNANCY Right 2020    Procedure: pelviscopy, right salpingectomy;  Surgeon: Kimberly Magana MD;  Location: [facility] OR     SALPINGECTOMY Bilateral 2020    Procedure: PELVISCOPY, BILATERAL SALPINGECTOMY, LYSIS OF ADHESIONS;  Surgeon: Kimberly Magana MD;  Location: [facility] OR      No Known Allergies   Social History     Tobacco Use     Smoking status: Never Smoker     Smokeless tobacco: Never Used   Substance Use Topics     Alcohol use: Not Currently      Wt Readings from Last 1 Encounters:   21 60.8 kg (134 lb 0.6 oz)        Anesthesia Evaluation   Pt has had prior anesthetic.         ROS/MED HX  ENT/Pulmonary:  - neg pulmonary ROS     Neurologic:  - neg neurologic ROS     Cardiovascular:  - neg cardiovascular ROS     METS/Exercise Tolerance:     Hematologic:       Musculoskeletal:  - neg musculoskeletal ROS     GI/Hepatic:  - neg GI/hepatic ROS     Renal/Genitourinary:  - neg Renal ROS     Endo:  - neg endo ROS     Psychiatric/Substance Use:  - neg psychiatric ROS     Infectious Disease:  - neg infectious disease ROS     Malignancy:       Other:            Physical Exam    Airway        Mallampati: II   TM distance: > 3 FB   Neck ROM: full   Mouth opening: > 3 cm    Respiratory Devices and Support         Dental  no notable dental history          Cardiovascular   cardiovascular exam normal          Pulmonary   pulmonary exam normal                OUTSIDE LABS:  CBC:   Lab Results   Component Value Date    WBC 8.7 03/19/2021    WBC 8.8 10/05/2020    HGB 13.6 03/19/2021    HGB 11.9 10/05/2020    HCT 40.6 03/19/2021    HCT 35.2 10/05/2020     (H) 03/19/2021     10/05/2020     BMP:   Lab Results   Component Value Date     08/11/2020     08/04/2020    POTASSIUM 4.1 08/11/2020    POTASSIUM 3.9 08/04/2020    CHLORIDE 106 08/11/2020    CHLORIDE 107 08/04/2020    CO2 29 08/11/2020    CO2 28 08/04/2020    BUN 10 08/11/2020    BUN 7 08/04/2020    CR 0.76 08/11/2020    CR 0.71 08/04/2020    GLC 94 08/11/2020    GLC 98 08/04/2020     COAGS: No results found for: PTT, INR, FIBR  POC:   Lab Results   Component Value Date    BGM 96 03/19/2021    HCGS Negative 03/19/2021     HEPATIC:   Lab Results   Component Value Date    ALBUMIN 3.4 08/11/2020    PROTTOTAL 8.2 08/11/2020    ALT 16 08/11/2020    AST 11 08/11/2020    ALKPHOS 71 08/11/2020    BILITOTAL 0.3 08/11/2020     OTHER:   Lab Results   Component Value Date    JUNI 9.1 08/11/2020       Anesthesia Plan    ASA Status:  2      Anesthesia Type: General.     - Airway: ETT   Induction: Intravenous.   Maintenance: Balanced.        Consents    Anesthesia Plan(s) and associated risks, benefits, and realistic alternatives discussed. Questions answered and patient/representative(s) expressed understanding.     - Discussed with:  Patient      - Specific Concerns: sore throat, PONV.     - Extended Intubation/Ventilatory Support Discussed: No.      - Patient is DNR/DNI Status: No    Use of blood products discussed: Yes.     Postoperative Care    Pain management: Oral pain medications.   PONV prophylaxis: Ondansetron (or other 5HT-3)     Comments:    GETA   routine ASA monitors            Hernandez Coleman MD

## 2021-03-19 NOTE — ANESTHESIA PROCEDURE NOTES
TAP Procedure Note  Pre-Procedure   Staff -        Anesthesiologist:  Jj Garcia MD       Performed By: anesthesiologist       Location: pre-op       Pre-Anesthestic Checklist: patient identified, IV checked, site marked, risks and benefits discussed, informed consent, monitors and equipment checked, pre-op evaluation, at physician/surgeon's request and post-op pain management  Timeout:       Correct Patient: Yes        Correct Procedure: Yes        Correct Site: Yes        Correct Position: Yes        Correct Laterality: Yes        Site Marked: Yes  Procedure Documentation  Procedure: TAP       Diagnosis: POST OPERATIVE PAIN       Laterality: bilateral       Patient Position: supine       Patient Prep/Sterile Barriers: sterile gloves, mask       Skin prep: Chloraprep       Needle Type: short bevel       Needle Gauge: 21.        Needle Length (millimeters): 110        - Ultrasound guided       - Ultrasound used to identify targeted nerve, plexus, vascular marker, or fascial plane and place a needle adjacent to it in real-time       - Ultrasound was used to visualize the spread of anesthetic in close proximity to the above referenced structure       - A permanent image is entered into the patient's record.    Assessment/Narrative         The placement was negative for: blood aspirated, painful injection and site bleeding       Paresthesias: No.     Bolus given via needle..        Secured via.        Insertion/Infusion Method: Single Shot       Complications: none       Injection made incrementally with aspirations every 5 mL.

## 2021-03-19 NOTE — ANESTHESIA PROCEDURE NOTES
Airway       Patient location during procedure: OR  Staff -        Anesthesiologist:  Hernandez Coleman MD       CRNA: Johnna Diaz APRN CRNA       Performed By: CRNAIndications and Patient Condition       Indications for airway management: abigail-procedural       Induction type:intravenous       Mask difficulty assessment: 1 - vent by mask    Final Airway Details       Final airway type: endotracheal airway       Successful airway: ETT - single  Endotracheal Airway Details        ETT size (mm): 6.5       Cuffed: yes       Successful intubation technique: direct laryngoscopy       DL Blade Type: Collins 2       Grade View of Cords: 1       Adjucts: stylet       Position: Right       Measured from: gums/teeth       Secured at (cm): 20       Bite block used: None    Post intubation assessment        Placement verified by: capnometry, equal breath sounds and chest rise        Number of attempts at approach: 1       Number of other approaches attempted: 0       Secured with: pink tape       Ease of procedure: easy       Dentition: Intact and Unchanged    Medication(s) Administered   Medication Administration Time: 3/19/2021 7:39 AM

## 2021-03-19 NOTE — ADDENDUM NOTE
Addendum  created 03/19/21 1200 by Jj Garcia MD    Attestation recorded in Intraprocedure, Child order released for a procedure order, Clinical Note Signed, Intraprocedure Attestations filed, Intraprocedure Blocks edited, Intraprocedure Meds edited, Result filed

## 2021-03-19 NOTE — ANESTHESIA POSTPROCEDURE EVALUATION
Patient: Sherry George    Procedure(s):  LYSIS, ADHESIONS, ROBOT-ASSISTED, WITH RIGHT PARATUBAL CYSTECTOMY AND MYOMECTOMY  DIAGNOSTIC HYSTEROSCOPY    Diagnosis:History of ectopic pregnancy [Z87.59]  Submucous leiomyoma of uterus [D25.0]  Diagnosis Additional Information: No value filed.    Anesthesia Type:  General    Note:  Disposition: Outpatient   Postop Pain Control: Uneventful            Sign Out: Well controlled pain   PONV: No   Neuro/Psych: Uneventful            Sign Out: Acceptable/Baseline neuro status   Airway/Respiratory: Uneventful            Sign Out: Acceptable/Baseline resp. status   CV/Hemodynamics:    Other NRE: NONE   DID A NON-ROUTINE EVENT OCCUR? No         Last vitals:  Vitals:    03/19/21 1100 03/19/21 1115 03/19/21 1130   BP: 130/82 130/83 119/78   Pulse: 91 83 79   Resp: 13 13 11   Temp:      SpO2: 100% 100% 100%       Last vitals prior to Anesthesia Care Transfer:  CRNA VITALS  3/19/2021 1017 - 3/19/2021 1117      3/19/2021             NIBP:  135/80    Pulse:  121    NIBP Mean:  94    Temp:  36.4  C (97.5  F)    SpO2:  100 %    Resp Rate (observed):  16          Electronically Signed By: Hernandez Coleman MD  March 19, 2021  11:45 AM

## 2021-03-23 LAB — COPATH REPORT: NORMAL

## 2021-03-25 ENCOUNTER — TELEPHONE (OUTPATIENT)
Dept: OBGYN | Facility: CLINIC | Age: 36
End: 2021-03-25

## 2021-03-25 NOTE — TELEPHONE ENCOUNTER
I spoke with Elmo regarding pathology report and her recovery from surgery.  She is doing much better.  Pain is much improved.  She denies fevers, nausea or vomiting.  Reviewed the findings on the path report, all benign.  Post-op check is scheduled for April.  Eileen Nelson MD

## 2021-04-29 ENCOUNTER — OFFICE VISIT (OUTPATIENT)
Dept: OBGYN | Facility: CLINIC | Age: 36
End: 2021-04-29
Attending: OBSTETRICS & GYNECOLOGY
Payer: COMMERCIAL

## 2021-04-29 VITALS
DIASTOLIC BLOOD PRESSURE: 84 MMHG | HEART RATE: 97 BPM | WEIGHT: 133 LBS | SYSTOLIC BLOOD PRESSURE: 123 MMHG | BODY MASS INDEX: 22.83 KG/M2

## 2021-04-29 DIAGNOSIS — Z98.890 STATUS POST ROBOT-ASSISTED SURGICAL PROCEDURE: Primary | ICD-10-CM

## 2021-04-29 PROCEDURE — 99212 OFFICE O/P EST SF 10 MIN: CPT | Performed by: OBSTETRICS & GYNECOLOGY

## 2021-04-29 PROCEDURE — G0463 HOSPITAL OUTPT CLINIC VISIT: HCPCS

## 2021-04-29 ASSESSMENT — ANXIETY QUESTIONNAIRES
GAD7 TOTAL SCORE: 0
1. FEELING NERVOUS, ANXIOUS, OR ON EDGE: NOT AT ALL
3. WORRYING TOO MUCH ABOUT DIFFERENT THINGS: NOT AT ALL
5. BEING SO RESTLESS THAT IT IS HARD TO SIT STILL: NOT AT ALL
6. BECOMING EASILY ANNOYED OR IRRITABLE: NOT AT ALL
2. NOT BEING ABLE TO STOP OR CONTROL WORRYING: NOT AT ALL
7. FEELING AFRAID AS IF SOMETHING AWFUL MIGHT HAPPEN: NOT AT ALL

## 2021-04-29 ASSESSMENT — PATIENT HEALTH QUESTIONNAIRE - PHQ9
5. POOR APPETITE OR OVEREATING: NOT AT ALL
SUM OF ALL RESPONSES TO PHQ QUESTIONS 1-9: 0

## 2021-04-29 ASSESSMENT — PAIN SCALES - GENERAL: PAINLEVEL: NO PAIN (0)

## 2021-04-29 NOTE — LETTER
4/29/2021       RE: Sherry George  [id number] [facility] [id number]     Dear Colleague,    Thank you for referring your patient, Sherry George, to the [facility] [place] at [facility]. Please see a copy of my visit note below.    Elmo returns for a post op check.  She did very well after surgery with minimal pain.  Has questions about when she could get back to abdominal exercises.  Hoping to move forward with next IVF cycle soon.  She has not yet had an ultrasound.    Physical exam:  /84   Pulse 97   Wt 60.3 kg (133 lb)   LMP 07/25/2020   Breastfeeding No   BMI 22.83 kg/m    Gen'l: appears well  Abd: well healed incision sites, soft, NT  Ext: no edema    Pathology:  A. PARATUBULAR CYST, RIGHT, CYSTECTOMY:   - Simple, benign serous cyst with histiocytes and multinucleated giant cells, consistent with the site of prior surgery   - Negative for malignancy     B. UTERUS, MYOMECTOMY:   - Adenomyoma (please, see comment)   - Negative for malignancy     Assessment/Plan  34 yo now 6 weeks s/p RA lysis of adhesion, myomectomy and paratubal cyst excision and diagnostic hysteroscopy doing well    - reviewed benign pathology report and surgical photos  - advised ok to return to exercising  - fine to proceed with fertility treatments and remains a candidate for vaginal delivery    Eileen Nelson MD

## 2021-04-29 NOTE — PROGRESS NOTES
Elmo returns for a post op check.  She did very well after surgery with minimal pain.  Has questions about when she could get back to abdominal exercises.  Hoping to move forward with next IVF cycle soon.  She has not yet had an ultrasound.    Physical exam:  /84   Pulse 97   Wt 60.3 kg (133 lb)   LMP 07/25/2020   Breastfeeding No   BMI 22.83 kg/m    Gen'l: appears well  Abd: well healed incision sites, soft, NT  Ext: no edema    Pathology:  A. PARATUBULAR CYST, RIGHT, CYSTECTOMY:   - Simple, benign serous cyst with histiocytes and multinucleated giant cells, consistent with the site of prior surgery   - Negative for malignancy     B. UTERUS, MYOMECTOMY:   - Adenomyoma (please, see comment)   - Negative for malignancy     Assessment/Plan  34 yo now 6 weeks s/p RA lysis of adhesion, myomectomy and paratubal cyst excision and diagnostic hysteroscopy doing well    - reviewed benign pathology report and surgical photos  - advised ok to return to exercising  - fine to proceed with fertility treatments and remains a candidate for vaginal delivery    Eileen Nelson MD

## 2021-04-29 NOTE — NURSING NOTE
Chief Complaint   Patient presents with     Surgical Followup     Leiomyoma of uterus 3/19/2021   Emma Jung LPN

## 2021-04-30 ASSESSMENT — ANXIETY QUESTIONNAIRES: GAD7 TOTAL SCORE: 0

## 2021-05-12 ENCOUNTER — TELEPHONE (OUTPATIENT)
Dept: OBGYN | Facility: CLINIC | Age: 36
End: 2021-05-12

## 2021-10-03 ENCOUNTER — LAB (OUTPATIENT)
Dept: LAB | Facility: CLINIC | Age: 36
End: 2021-10-03
Payer: COMMERCIAL

## 2021-10-03 DIAGNOSIS — Z11.3 SCREEN FOR SEXUALLY TRANSMITTED DISEASES: Primary | ICD-10-CM

## 2021-10-03 DIAGNOSIS — Z00.00 ROUTINE GENERAL MEDICAL EXAMINATION AT A HEALTH CARE FACILITY: ICD-10-CM

## 2021-10-03 LAB
ALBUMIN SERPL-MCNC: 3.2 G/DL (ref 3.4–5)
ALP SERPL-CCNC: 47 U/L (ref 40–150)
ALT SERPL W P-5'-P-CCNC: 21 U/L (ref 0–50)
ANION GAP SERPL CALCULATED.3IONS-SCNC: 8 MMOL/L (ref 3–14)
AST SERPL W P-5'-P-CCNC: 12 U/L (ref 0–45)
BILIRUB SERPL-MCNC: 0.3 MG/DL (ref 0.2–1.3)
BUN SERPL-MCNC: 8 MG/DL (ref 7–30)
CALCIUM SERPL-MCNC: 8.3 MG/DL (ref 8.5–10.1)
CHLORIDE BLD-SCNC: 107 MMOL/L (ref 94–109)
CO2 SERPL-SCNC: 24 MMOL/L (ref 20–32)
CREAT SERPL-MCNC: 0.78 MG/DL (ref 0.52–1.04)
ERYTHROCYTE [DISTWIDTH] IN BLOOD BY AUTOMATED COUNT: 13.5 % (ref 10–15)
GFR SERPL CREATININE-BSD FRML MDRD: >90 ML/MIN/1.73M2
GLUCOSE BLD-MCNC: 87 MG/DL (ref 70–99)
HCT VFR BLD AUTO: 38.7 % (ref 35–47)
HGB BLD-MCNC: 12.9 G/DL (ref 11.7–15.7)
MCH RBC QN AUTO: 29.4 PG (ref 26.5–33)
MCHC RBC AUTO-ENTMCNC: 33.3 G/DL (ref 31.5–36.5)
MCV RBC AUTO: 88 FL (ref 78–100)
PLATELET # BLD AUTO: 378 10E3/UL (ref 150–450)
POTASSIUM BLD-SCNC: 3.5 MMOL/L (ref 3.4–5.3)
PROT SERPL-MCNC: 7.9 G/DL (ref 6.8–8.8)
RBC # BLD AUTO: 4.39 10E6/UL (ref 3.8–5.2)
SODIUM SERPL-SCNC: 139 MMOL/L (ref 133–144)
T PALLIDUM AB SER QL: NONREACTIVE
T4 FREE SERPL-MCNC: 0.95 NG/DL (ref 0.76–1.46)
TSH SERPL DL<=0.005 MIU/L-ACNC: 0.66 MU/L (ref 0.4–4)
WBC # BLD AUTO: 7.9 10E3/UL (ref 4–11)

## 2021-10-03 PROCEDURE — 86645 CMV ANTIBODY IGM: CPT

## 2021-10-03 PROCEDURE — 87591 N.GONORRHOEAE DNA AMP PROB: CPT

## 2021-10-03 PROCEDURE — 86644 CMV ANTIBODY: CPT

## 2021-10-03 PROCEDURE — 84439 ASSAY OF FREE THYROXINE: CPT

## 2021-10-03 PROCEDURE — 85027 COMPLETE CBC AUTOMATED: CPT

## 2021-10-03 PROCEDURE — 87491 CHLMYD TRACH DNA AMP PROBE: CPT

## 2021-10-03 PROCEDURE — 82306 VITAMIN D 25 HYDROXY: CPT

## 2021-10-03 PROCEDURE — 86780 TREPONEMA PALLIDUM: CPT

## 2021-10-03 PROCEDURE — 87340 HEPATITIS B SURFACE AG IA: CPT

## 2021-10-03 PROCEDURE — 86705 HEP B CORE ANTIBODY IGM: CPT

## 2021-10-03 PROCEDURE — 87389 HIV-1 AG W/HIV-1&-2 AB AG IA: CPT

## 2021-10-03 PROCEDURE — 80053 COMPREHEN METABOLIC PANEL: CPT

## 2021-10-03 PROCEDURE — 36415 COLL VENOUS BLD VENIPUNCTURE: CPT

## 2021-10-03 PROCEDURE — 86803 HEPATITIS C AB TEST: CPT

## 2021-10-03 PROCEDURE — 84443 ASSAY THYROID STIM HORMONE: CPT

## 2021-10-03 PROCEDURE — 86704 HEP B CORE ANTIBODY TOTAL: CPT

## 2021-10-04 LAB
C TRACH DNA SPEC QL NAA+PROBE: NEGATIVE
CMV DNA SPEC NAA+PROBE-ACNC: NOT DETECTED IU/ML
CMV IGG SERPL IA-ACNC: 4.7 U/ML
CMV IGG SERPL IA-ACNC: ABNORMAL
CMV IGM SERPL IA-ACNC: <8 AU/ML
CMV IGM SERPL IA-ACNC: NEGATIVE
DEPRECATED CALCIDIOL+CALCIFEROL SERPL-MC: 43 UG/L (ref 20–75)
HBV CORE AB SERPL QL IA: NONREACTIVE
HBV CORE IGM SERPL QL IA: NONREACTIVE
HBV SURFACE AG SERPL QL IA: NONREACTIVE
HCV AB SERPL QL IA: NONREACTIVE
HIV 1+2 AB+HIV1 P24 AG SERPL QL IA: NONREACTIVE
N GONORRHOEA DNA SPEC QL NAA+PROBE: NEGATIVE

## 2021-10-08 ENCOUNTER — TRANSFERRED RECORDS (OUTPATIENT)
Dept: HEALTH INFORMATION MANAGEMENT | Facility: CLINIC | Age: 36
End: 2021-10-08
Payer: COMMERCIAL

## 2021-10-11 ENCOUNTER — HEALTH MAINTENANCE LETTER (OUTPATIENT)
Age: 36
End: 2021-10-11

## 2021-10-26 ENCOUNTER — TELEPHONE (OUTPATIENT)
Dept: OBGYN | Facility: CLINIC | Age: 36
End: 2021-10-26

## 2021-10-26 ENCOUNTER — OFFICE VISIT (OUTPATIENT)
Dept: OBGYN | Facility: CLINIC | Age: 36
End: 2021-10-26
Payer: COMMERCIAL

## 2021-10-26 VITALS — DIASTOLIC BLOOD PRESSURE: 80 MMHG | SYSTOLIC BLOOD PRESSURE: 120 MMHG | WEIGHT: 139 LBS | BODY MASS INDEX: 23.86 KG/M2

## 2021-10-26 DIAGNOSIS — D25.0 SUBMUCOUS MYOMA OF UTERUS: Primary | ICD-10-CM

## 2021-10-26 DIAGNOSIS — Z11.52 ENCOUNTER FOR SCREENING FOR COVID-19: Primary | ICD-10-CM

## 2021-10-26 PROCEDURE — 99242 OFF/OP CONSLTJ NEW/EST SF 20: CPT | Performed by: OBSTETRICS & GYNECOLOGY

## 2021-10-26 NOTE — PROGRESS NOTES
SUBJECTIVE:   CC: submucous myoma                                               Sherry George is a 36 year old  female who presents to clinic today for surgical consult, sent by Wanda Mchugh at [facility] for evaluation and treatment of submucous myoma detected at the left cornua during embryo transfer trial run. Myoma reportedly 1-2cm. Sherry Barrera is hoping to proceed with surgery in the near future so she can return to [facility] for next IVF cycle as soon as possible. Currently on combined oral contraceptives for suppression of endometriosis. She has a history of Da Fred assisted myomectomy 3/2021. She has also had a bilateral salpingectomy during surgery for ectopic pregnancy.     Problem list and histories reviewed & adjusted, as indicated.  Additional history: as documented.    Hgb 12.9 10/3/21    ROS:  Const: no weight changes, fever, chills  : no dysuria, hematuria, urinary frequency, urgency, hesitancy  GI: no constipation, diarrhea, abdominal pain  Heme: no history blood clots or easy bruising/bleeding  CV: no chest pain, palpitations  Pulm: no shortness of breath, chest tightness, cough, wheeze    Patient Active Problem List   Diagnosis     Ectopic pregnancy, tubal     Tubal ectopic pregnancy, unspecified laterality, unspecified whether intrauterine pregnancy present     Other ectopic pregnancy without intrauterine pregnancy     Ectopic pregnancy without intrauterine pregnancy, unspecified location     History of ectopic pregnancy     Submucous leiomyoma of uterus     Past Surgical History:   Procedure Laterality Date     DAVINCI LYSIS OF ADHESIONS Right 3/19/2021    Procedure: LYSIS, ADHESIONS, ROBOT-ASSISTED, WITH RIGHT PARATUBAL CYSTECTOMY AND MYOMECTOMY;  Surgeon: Eileen Nelson MD;  [facility]     HYSTEROSCOPY DIAGNOSTIC N/A 3/19/2021    Procedure: DIAGNOSTIC HYSTEROSCOPY;  Surgeon: Eileen Nelson MD;  [facility]     LAPAROSCOPIC EVACUATION ECTOPIC PREGNANCY Right 2020     Procedure: pelviscopy, right salpingectomy;  Surgeon: Kimberly Magana MD;  Location: [facility]     SALPINGECTOMY Bilateral 2020    Procedure: PELVISCOPY, BILATERAL SALPINGECTOMY, LYSIS OF ADHESIONS;  Surgeon: Kimberly Magana MD;  Location: [facility] OR      Social History     Tobacco Use     Smoking status: Never Smoker     Smokeless tobacco: Never Used   Substance Use Topics     Alcohol use: Not Currently      Problem (# of Occurrences) Relation (Name,Age of Onset)    Family History Negative (2) Mother, Sister    Hyperlipidemia (1) Father       Negative family history of: Breast Cancer, Ovarian Cancer, Colon Cancer, Fibroids, Endometriosis            acetaminophen (TYLENOL) 325 MG tablet, Take 3 tablets (975 mg) by mouth every 6 hours as needed for mild pain  drospirenone-ethinyl estradiol (JENNIFFER) 3-0.02 MG tablet, Take 1 tablet by mouth daily  ibuprofen (ADVIL/MOTRIN) 800 MG tablet, Take 1 tablet (800 mg) by mouth every 6 hours as needed for other (mild and/or inflammatory pain)  Prenatal Vit-Fe Fumarate-FA (PRENATAL MULTIVITAMIN  PLUS IRON) 27-1 MG TABS, Take 1 tablet by mouth daily  VITAMIN D PO,     No current facility-administered medications on file prior to visit.    Allergies   Allergen Reactions     Miconazole Swelling       OBJECTIVE:   /80   Wt 63 kg (139 lb)   LMP 2021   BMI 23.86 kg/m     Const: sitting in chair in no acute distress, comfortable  Eyes: no scleral icterus, EOMI  CV: regular rate, no murmur, well perfused  Pulm: CTAB,  no increased work of breathing, no cough  Skin: warm and dry, no rashes/lesions  Psych: mood stable, appropriate affect  Abd: soft, no hepatosplenomegaly, non-tender to palpation  Neuro: A+Ox3     ASSESSMENT/PLAN:                                                    Sherry George is a 36 year old female  here for pre-op consultation for hysteroscopic myomectomy for cornual submucous fibroid. Reviewed risk, benefits, and alternatives. She has ibuprofen and  tylenol at home for post-op recovery. Interested in early November procedure.     She is low risk for minor surgical procedure, this document serves as her surgical clearance.      Case requested.    Netta Deleon MD  Obstetrics and Gynecology   [facility]

## 2021-10-26 NOTE — NURSING NOTE
"Chief Complaint   Patient presents with     Consult     patient is starting the process for IVF, she recently had a hysteroscopy with [facility] on 10/08 that showed uterine fibroid. Patient recommended to see OB to have fibroid removed before starting IVF.       Initial /80   Wt 63 kg (139 lb)   LMP 2021   BMI 23.86 kg/m   Estimated body mass index is 23.86 kg/m  as calculated from the following:    Height as of 3/19/21: 1.626 m (5' 4\").    Weight as of this encounter: 63 kg (139 lb).  BP completed using cuff size: regular    Questioned patient about current smoking habits.  Pt. has never smoked.          The following HM Due: pap smear    Cady Evans CMA               "

## 2021-10-26 NOTE — TELEPHONE ENCOUNTER
Surgeon:Netta Deleon MD   Assist:  No   Location: [facility]   Date/time preference:  Nov 3rd 11am or later if possible     Surgery:  operative hysteroscopy, myomectomy   Length of Surgery:  30 min   Diagnosis:  submucous myoma   Anesthesia type:  GENERAL     Special instructions / equipment:  fluid management, mysure reach available   Am admit or same day: SAME DAY   Bowel prep: No   Pre op: performed day   Office visit with surgeon prior to surgery: No     Netta Deleon MD

## 2021-11-01 ENCOUNTER — LAB (OUTPATIENT)
Dept: LAB | Facility: CLINIC | Age: 36
End: 2021-11-01
Payer: COMMERCIAL

## 2021-11-01 DIAGNOSIS — Z11.52 ENCOUNTER FOR SCREENING FOR COVID-19: ICD-10-CM

## 2021-11-01 LAB — SARS-COV-2 RNA RESP QL NAA+PROBE: NEGATIVE

## 2021-11-01 PROCEDURE — U0005 INFEC AGEN DETEC AMPLI PROBE: HCPCS

## 2021-11-03 ENCOUNTER — LAB REQUISITION (OUTPATIENT)
Dept: LAB | Facility: CLINIC | Age: 36
End: 2021-11-03
Payer: COMMERCIAL

## 2021-11-03 PROCEDURE — 88305 TISSUE EXAM BY PATHOLOGIST: CPT | Performed by: PATHOLOGY

## 2021-11-03 PROCEDURE — 88305 TISSUE EXAM BY PATHOLOGIST: CPT | Mod: 26 | Performed by: PATHOLOGY

## 2021-11-03 PROCEDURE — 88305 TISSUE EXAM BY PATHOLOGIST: CPT | Mod: TC,ORL | Performed by: OBSTETRICS & GYNECOLOGY

## 2021-11-04 LAB
PATH REPORT.COMMENTS IMP SPEC: NORMAL
PATH REPORT.COMMENTS IMP SPEC: NORMAL
PATH REPORT.FINAL DX SPEC: NORMAL
PATH REPORT.GROSS SPEC: NORMAL
PATH REPORT.MICROSCOPIC SPEC OTHER STN: NORMAL
PATH REPORT.RELEVANT HX SPEC: NORMAL
PHOTO IMAGE: NORMAL

## 2021-11-08 ENCOUNTER — MYC MEDICAL ADVICE (OUTPATIENT)
Dept: OBGYN | Facility: CLINIC | Age: 36
End: 2021-11-08
Payer: COMMERCIAL

## 2021-11-08 DIAGNOSIS — Z98.890 S/P LAPAROSCOPY: ICD-10-CM

## 2021-11-08 RX ORDER — DROSPIRENONE AND ETHINYL ESTRADIOL 0.02-3(28)
1 KIT ORAL DAILY
Qty: 84 TABLET | Refills: 0 | Status: SHIPPED | OUTPATIENT
Start: 2021-11-08 | End: 2022-02-01

## 2022-01-30 ENCOUNTER — HEALTH MAINTENANCE LETTER (OUTPATIENT)
Age: 37
End: 2022-01-30

## 2022-02-01 DIAGNOSIS — Z98.890 S/P LAPAROSCOPY: ICD-10-CM

## 2022-02-01 RX ORDER — DROSPIRENONE AND ETHINYL ESTRADIOL 0.02-3(28)
1 KIT ORAL DAILY
Qty: 84 TABLET | Refills: 0 | Status: SHIPPED | OUTPATIENT
Start: 2022-02-01 | End: 2022-04-14

## 2022-02-01 NOTE — TELEPHONE ENCOUNTER
Medication is being filled for 1 time refill only due to:  Patient needs to be seen because it has been more than one year since last visit.     Health maintenance notes that this pt is overdue for a pap smear.  Socorro OWENS RN

## 2022-03-21 ENCOUNTER — TRANSFERRED RECORDS (OUTPATIENT)
Dept: HEALTH INFORMATION MANAGEMENT | Facility: CLINIC | Age: 37
End: 2022-03-21
Payer: COMMERCIAL

## 2022-04-14 ENCOUNTER — PRENATAL OFFICE VISIT (OUTPATIENT)
Dept: NURSING | Facility: CLINIC | Age: 37
End: 2022-04-14
Payer: COMMERCIAL

## 2022-04-14 ENCOUNTER — TELEPHONE (OUTPATIENT)
Dept: OBGYN | Facility: CLINIC | Age: 37
End: 2022-04-14

## 2022-04-14 DIAGNOSIS — O09.529 SUPERVISION OF HIGH-RISK PREGNANCY OF ELDERLY MULTIGRAVIDA: Primary | ICD-10-CM

## 2022-04-14 PROCEDURE — 99207 PR NO CHARGE NURSE ONLY: CPT

## 2022-04-14 RX ORDER — ESTRADIOL 0.1 MG/D
1 FILM, EXTENDED RELEASE TRANSDERMAL
COMMUNITY
End: 2022-04-20

## 2022-04-14 RX ORDER — PROGESTERONE 100 MG/1
100 INSERT VAGINAL 2 TIMES DAILY
Status: ON HOLD | COMMUNITY
End: 2022-04-20

## 2022-04-14 RX ORDER — ASPIRIN 81 MG/1
81 TABLET, CHEWABLE ORAL DAILY
COMMUNITY
End: 2022-09-07

## 2022-04-14 NOTE — NURSING NOTE
NPN nurse visit done over the phone. Pt will be given NPN folder and book at her upcoming appt.   Discussed optional screening available to assess chromosomal anomalies. Questions answered. Pt advised to call the clinic if she has any questions or concerns related to her pregnancy. Prenatal labs will be obtained at her upcoming appt. New prenatal visit scheduled on 4/18 with Kala.  IVF pregnancy done through [facility]. Pt will have the records faxed to the [facility] office. Sperm donor.    10w2d    Last pap 6/2019 WNL. Done in [place]        Patient supplied answers from flow sheet for:  Prenatal OB Questionnaire.  Past Medical History  Have you ever recieved care for your mental health? : (P) No  Have you ever been in a major accident or suffered serious trauma?: (P) No  Within the last year, has anyone hit, slapped, kicked or otherwise hurt you?: (P) No  In the last year, has anyone forced you to have sex when you didn't want to?: (P) No    Past Medical History 2   Have you ever received a blood transfusion?: (P) No  Would you accept a blood transfusion if was medically recommended?: (P) Yes  Does anyone in your home smoke?: (P) No   Is your blood type Rh negative?: (!) (P) Yes  Have you ever ?: (P) No  Have you been hospitalized for a nonsurgical reason excluding normal delivery?: (P) No  Have you ever had an abnormal pap smear?: (P) No    Past Medical History (Continued)  Do you have a history of abnormalities of the uterus?: (P) No  Did your mother take TABATHA or any other hormones when she was pregnant with you?: (P) No  Do you have any other problems we have not asked about which you feel may be important to this pregnancy?: (P) No       Socorro OWENS RN

## 2022-04-15 NOTE — TELEPHONE ENCOUNTER
Type of surgery: operative hysteroscopy, myomectomy  Location of surgery: [facility]  Date and time of surgery: 11/3/21 @ 11:30 am  Surgeon: Dr. Deleon  Pre-Op Appt Date: 10/26/21  Post-Op Appt Date:    Packet sent out: Yes  Pre-cert/Authorization completed:  No  Date: 10/26/21     DISPLAY PLAN FREE TEXT DISPLAY PLAN FREE TEXT DISPLAY PLAN FREE TEXT DISPLAY PLAN FREE TEXT DISPLAY PLAN FREE TEXT DISPLAY PLAN FREE TEXT

## 2022-04-18 ENCOUNTER — PRENATAL OFFICE VISIT (OUTPATIENT)
Dept: OBGYN | Facility: CLINIC | Age: 37
End: 2022-04-18
Payer: COMMERCIAL

## 2022-04-18 ENCOUNTER — ANCILLARY PROCEDURE (OUTPATIENT)
Dept: ULTRASOUND IMAGING | Facility: CLINIC | Age: 37
End: 2022-04-18
Payer: COMMERCIAL

## 2022-04-18 DIAGNOSIS — Z11.59 ENCOUNTER FOR SCREENING FOR OTHER VIRAL DISEASES: Primary | ICD-10-CM

## 2022-04-18 DIAGNOSIS — Z11.59 ENCOUNTER FOR SCREENING FOR OTHER VIRAL DISEASES: ICD-10-CM

## 2022-04-18 DIAGNOSIS — O26.891 RH NEGATIVE STATUS DURING PREGNANCY IN FIRST TRIMESTER: ICD-10-CM

## 2022-04-18 DIAGNOSIS — O09.529 SUPERVISION OF HIGH-RISK PREGNANCY OF ELDERLY MULTIGRAVIDA: ICD-10-CM

## 2022-04-18 DIAGNOSIS — O09.521 AMA (ADVANCED MATERNAL AGE) MULTIGRAVIDA 35+, FIRST TRIMESTER: ICD-10-CM

## 2022-04-18 DIAGNOSIS — O02.89 NON-VIABLE PREGNANCY: Primary | ICD-10-CM

## 2022-04-18 DIAGNOSIS — Z67.91 RH NEGATIVE STATUS DURING PREGNANCY IN FIRST TRIMESTER: ICD-10-CM

## 2022-04-18 DIAGNOSIS — O09.811 PREGNANCY RESULTING FROM IN VITRO FERTILIZATION IN FIRST TRIMESTER: ICD-10-CM

## 2022-04-18 PROCEDURE — U0003 INFECTIOUS AGENT DETECTION BY NUCLEIC ACID (DNA OR RNA); SEVERE ACUTE RESPIRATORY SYNDROME CORONAVIRUS 2 (SARS-COV-2) (CORONAVIRUS DISEASE [COVID-19]), AMPLIFIED PROBE TECHNIQUE, MAKING USE OF HIGH THROUGHPUT TECHNOLOGIES AS DESCRIBED BY CMS-2020-01-R: HCPCS | Performed by: OBSTETRICS & GYNECOLOGY

## 2022-04-18 PROCEDURE — 76817 TRANSVAGINAL US OBSTETRIC: CPT | Performed by: OBSTETRICS & GYNECOLOGY

## 2022-04-18 PROCEDURE — 76801 OB US < 14 WKS SINGLE FETUS: CPT | Performed by: OBSTETRICS & GYNECOLOGY

## 2022-04-18 PROCEDURE — 99214 OFFICE O/P EST MOD 30 MIN: CPT | Performed by: OBSTETRICS & GYNECOLOGY

## 2022-04-18 PROCEDURE — U0005 INFEC AGEN DETEC AMPLI PROBE: HCPCS | Performed by: OBSTETRICS & GYNECOLOGY

## 2022-04-18 NOTE — NURSING NOTE
"Chief Complaint   Patient presents with     Prenatal Care     NPN ultrasound done today - discuss results.  Here today with her mother   10w6d  Originally on CNM schedule    initial LMP 02/01/2022  Estimated body mass index is 23.86 kg/m  as calculated from the following:    Height as of 3/19/21: 1.626 m (5' 4\").    Weight as of 10/26/21: 63 kg (139 lb).  BP completed using cuff size NA (Not Taken).  Janna Cha CMA    "

## 2022-04-18 NOTE — PATIENT INSTRUCTIONS
Thank you for coming to see the Midwives at the   [facility]!    We will notify you about your labs that were drawn today once we get the results back or if you have [employer] they will be posted there as well    We will call you personally with results that require further discussion    If any referrals were ordered today you should be getting a call in the next week or you may need to call the number listed with your referral to schedule.          If you need any refills of medications please call your pharmacy and they will contact us    If you have any questions or concerns before your next visit, you can reach the clinic at [phone], or send the midwives a message through [employer].    If you  wish to schedule another appointment, please call our office at [phone]. You can also make appointments through [employer]      If you have a medical emergency please call [phone].    Because you are pregnant, we have additional resources for you:    You may call our consulting RN's during normal business hours for non-urgent questions about your pregnancy.    After hours you may reach someone for urgent questions or issues at [phone].  There is always a midwife on call 24 hours a day.    Prenatal Reminders:    Before 14 weeks: Dating ultrasound, Genetic testing       This ultrasound helps us determine your dates accurately. Verifi can be drawn anytime after 10 weeks of gestation  16 weeks: Optional genetic testing (quad screen) or single AFP       This testing helps understand your baby's risk for some genetic abnormalities.  20 weeks:  Screening ultrasound (fetal survey)       This testing will look for early growth abnormalities, and may tell the baby's sex if you wish to find out.  28 weeks: One hour sugar test (GCT), hemoglobin and platelets       This test helps identify diabetes of pregnancy or gestational diabetes.  We also look      at the iron in your blood and how well your blood clots.  28 - 36 weeks:  Tetanus shot (Tdap)       This shot helps protect you and your baby from tetanus and whooping cough.  36 weeks and later: Group B Strep test (GBS)       This test helps predict if you need antibiotics in labor to prevent infection in your baby.  Anytime September to April:  Flu shot       This shot helps protect you and your family from the flu.  This is especially important during pregnancy        Any time during or after your pregnancy you may experience increased depression and/or mood changes.    We are here to support you. Please contact us if you are:  Feeling anxious  Overwhelmed or sad   Trouble sleeping  Crying uncontrollably  Trouble caring for yourself or baby.    The typical schedule after your first visit today you can expect:     Visit 2 - 12-16 weeks  Visit 3 - 20 weeks  Visit 4 - 24 weeks  Visit 5 - 28 weeks  Visit 6 - 32 weeks  Visit 7 - 34 weeks  Visit 8 - 36 weeks  Weekly after 36 weeks until delivery.    If anything comes up between your visits or you have concerns please don't hesitate to contact us.    Secure access to your medical record:  Use [employer] (secure email communication and access to your chart) to send your primary care provider a message or make an appointment. Ask someone on your Team how to sign up for [employer]. To log on to [employer] or for more information in [employer] please visit the website at [email or web address].       Certified Nurse Midwife (CNM) Team  GRAHAM Suarez, GIANA Soto, GRAHAM, CNM  GRAHAM Garcia, CNM  Luma Levine, GRAHAM, CNM  GRAAHM Dixon, CNM    Again, thank you for choosing the midwives at [facility].  We are excited to be a part of your pregnancy. Please let us know how we can best partner with you to improve your and your family's health.

## 2022-04-18 NOTE — PROGRESS NOTES
T     Problem visit and History & Physical  SUBJECTIVE:                                                   Sherry George is a 36 year old female who presents to clinic today for the following health issue(s):  Patient presents with:  Prenatal Care: NPN ultrasound done today - discuss results.  Here today with her mother      HPI:  35 yo  at 10w6d by embryo transfer with fibroid uterus and Hx of ectopic.  U/S done today shows non viable IUP measuring 8wks size.  Due to presence of multiple fibroids distorting the cavity the exact location of the pregnancy is somewhat unclear.  [facility] U/S on 3/21 showed a viable IUP at 6w4d within the uterine cavity.    Concern for possibility of a cornual pregnancy raised by Dr. Lyn who read today's films.  Images were reviewed with Dr. Shaq George from [facility] who felt the pregnancy was most likely intrauterine but the fibroid related distortion and prior Hx of ectopic did create some ambiguity and concern for a cornual location.    Treatment with methotrexate was briefly discussed and not advised by Dr. George.    Patient and Mother (who indicates she is a midwife) and was present for today's discussion were made aware of the non viable nature of the pregnancy and degree of ambiguity regarding location.    Options of management were discussed including expectant, methotrexate which was not advised in this setting, or D&C    Does not want intervention today as very emotional at present which is understandable.  Will schedule suction D&C under U/S guidance with possible laparoscopy if an interstitial pregnancy is encountered.    Procedure to be scheduled in the next 1-2 days based on OR/physicain availability    Patient's last menstrual period was 2022..     Problem list and histories reviewed & adjusted, as indicated.  Additional history: as documented.    Patient Active Problem List   Diagnosis     Ectopic pregnancy, tubal     Tubal ectopic pregnancy, unspecified  laterality, unspecified whether intrauterine pregnancy present     Other ectopic pregnancy without intrauterine pregnancy     Ectopic pregnancy without intrauterine pregnancy, unspecified location     History of ectopic pregnancy     Submucous leiomyoma of uterus     Past Surgical History:   Procedure Laterality Date     DAVINCI LYSIS OF ADHESIONS Right 3/19/2021    Procedure: LYSIS, ADHESIONS, ROBOT-ASSISTED, WITH RIGHT PARATUBAL CYSTECTOMY AND MYOMECTOMY;  Surgeon: Eileen Nelson MD;  Location: [facility] OR     HYSTEROSCOPY DIAGNOSTIC N/A 3/19/2021    Procedure: DIAGNOSTIC HYSTEROSCOPY;  Surgeon: Eileen Nelson MD;  Location: [facility] OR     LAPAROSCOPIC EVACUATION ECTOPIC PREGNANCY Right 7/29/2020    Procedure: pelviscopy, right salpingectomy;  Surgeon: Kimberly Magana MD;  Location: [facility] OR     SALPINGECTOMY Bilateral 7/29/2020    Procedure: PELVISCOPY, BILATERAL SALPINGECTOMY, LYSIS OF ADHESIONS;  Surgeon: Kimberly Magana MD;  Location: [facility] OR      Social History     Tobacco Use     Smoking status: Never Smoker     Smokeless tobacco: Never Used   Substance Use Topics     Alcohol use: Not Currently      Problem (# of Occurrences) Relation (Name,Age of Onset)    Bone Cancer (1) Paternal Grandmother    Diabetes (1) Maternal Grandmother    Family History Negative (2) Mother, Sister    Hyperlipidemia (1) Father       Negative family history of: Breast Cancer, Ovarian Cancer, Colon Cancer, Fibroids, Endometriosis            acetaminophen (TYLENOL) 325 MG tablet, Take 3 tablets (975 mg) by mouth every 6 hours as needed for mild pain  aspirin (ASA) 81 MG chewable tablet, Take 81 mg by mouth daily  estradiol (VIVELLE-DOT) 0.1 MG/24HR bi-weekly patch, Place 1 patch onto the skin twice a week  Prenatal Vit-Fe Fumarate-FA (PRENATAL MULTIVITAMIN  PLUS IRON) 27-1 MG TABS, Take 1 tablet by mouth daily  progesterone (ENDOMETRIN) 100 MG vaginal insert, Place 100 mg vaginally 2 times daily  progesterone, in olive oil, 100 MG/ML CMPD  injection, Inject 100 mg into the muscle daily  VITAMIN D PO,     No current facility-administered medications on file prior to visit.    Allergies   Allergen Reactions     Miconazole Swelling       ROS:  5 point ROS negative except as noted above in HPI, including Gen., Resp., CV, GI &  system review.  No bleeding.  Has been taking progesterone supplementation    OBJECTIVE:     LMP 02/01/2022    BMI: There is no height or weight on file to calculate BMI.  General: Alert and oriented, no distress.  Psychiatric: Mood and affect within normal limits.  Heart RR  Lungs clear  Abdomen: Soft, nontender, no hepatosplenomegaly, no rebound or guarding, no masses, no hernias.   Pelvic deferred  Ext NT    ASSESSMENT/PLAN:                                                        ICD-10-CM    1. Non-viable pregnancy  O02.89 Case Request: DILATION AND CURETTAGE, UTERUS, USING SUCTION, WITH ULTRASOUND GUIDANCE, SURGICAL PROCEDURE, LAPAROSCOPIC, ADULT     Asymptomatic COVID-19 Virus (Coronavirus) by PCR Nose   2. Pregnancy resulting from in vitro fertilization in first trimester  O09.811 Case Request: DILATION AND CURETTAGE, UTERUS, USING SUCTION, WITH ULTRASOUND GUIDANCE, SURGICAL PROCEDURE, LAPAROSCOPIC, ADULT     Asymptomatic COVID-19 Virus (Coronavirus) by PCR Nose   3. AMA (advanced maternal age) multigravida 35+, first trimester  O09.521 Case Request: DILATION AND CURETTAGE, UTERUS, USING SUCTION, WITH ULTRASOUND GUIDANCE, SURGICAL PROCEDURE, LAPAROSCOPIC, ADULT     Asymptomatic COVID-19 Virus (Coronavirus) by PCR Nose   4. Rh negative status during pregnancy in first trimester  O26.891 Case Request: DILATION AND CURETTAGE, UTERUS, USING SUCTION, WITH ULTRASOUND GUIDANCE, SURGICAL PROCEDURE, LAPAROSCOPIC, ADULT    Z67.91 Asymptomatic COVID-19 Virus (Coronavirus) by PCR Nose     Will need Rhogam post procedure    Joshua Marcial MD  [facility]

## 2022-04-19 LAB — SARS-COV-2 RNA RESP QL NAA+PROBE: NEGATIVE

## 2022-04-20 ENCOUNTER — TELEPHONE (OUTPATIENT)
Dept: OBGYN | Facility: CLINIC | Age: 37
End: 2022-04-20

## 2022-04-20 ENCOUNTER — ANESTHESIA (OUTPATIENT)
Dept: SURGERY | Facility: CLINIC | Age: 37
End: 2022-04-20
Payer: COMMERCIAL

## 2022-04-20 ENCOUNTER — HOSPITAL ENCOUNTER (OUTPATIENT)
Facility: CLINIC | Age: 37
Discharge: HOME | End: 2022-04-20
Attending: OBSTETRICS & GYNECOLOGY | Admitting: OBSTETRICS & GYNECOLOGY
Payer: COMMERCIAL

## 2022-04-20 ENCOUNTER — ANESTHESIA EVENT (OUTPATIENT)
Dept: SURGERY | Facility: CLINIC | Age: 37
End: 2022-04-20
Payer: COMMERCIAL

## 2022-04-20 ENCOUNTER — HOSPITAL ENCOUNTER (OUTPATIENT)
Dept: ULTRASOUND IMAGING | Facility: CLINIC | Age: 37
Discharge: HOME | End: 2022-04-20
Attending: OBSTETRICS & GYNECOLOGY | Admitting: OBSTETRICS & GYNECOLOGY
Payer: COMMERCIAL

## 2022-04-20 ENCOUNTER — LAB (OUTPATIENT)
Dept: LAB | Facility: CLINIC | Age: 37
End: 2022-04-20
Attending: OBSTETRICS & GYNECOLOGY
Payer: COMMERCIAL

## 2022-04-20 VITALS
HEART RATE: 93 BPM | BODY MASS INDEX: 23.2 KG/M2 | SYSTOLIC BLOOD PRESSURE: 99 MMHG | OXYGEN SATURATION: 100 % | DIASTOLIC BLOOD PRESSURE: 72 MMHG | RESPIRATION RATE: 16 BRPM | TEMPERATURE: 97.9 F | HEIGHT: 64 IN | WEIGHT: 135.9 LBS

## 2022-04-20 DIAGNOSIS — O26.891 RH NEGATIVE STATUS DURING PREGNANCY IN FIRST TRIMESTER: ICD-10-CM

## 2022-04-20 DIAGNOSIS — O00.90 ECTOPIC PREGNANCY: ICD-10-CM

## 2022-04-20 DIAGNOSIS — O00.80 OTHER ECTOPIC PREGNANCY WITHOUT INTRAUTERINE PREGNANCY: ICD-10-CM

## 2022-04-20 DIAGNOSIS — G89.18 POST-OP PAIN: Primary | ICD-10-CM

## 2022-04-20 DIAGNOSIS — O02.89 NONVIABLE PREGNANCY: ICD-10-CM

## 2022-04-20 DIAGNOSIS — O00.109 TUBAL ECTOPIC PREGNANCY, UNSPECIFIED LATERALITY, UNSPECIFIED WHETHER INTRAUTERINE PREGNANCY PRESENT: ICD-10-CM

## 2022-04-20 DIAGNOSIS — O02.89 NONVIABLE PREGNANCY: Primary | ICD-10-CM

## 2022-04-20 DIAGNOSIS — O09.811 PREGNANCY RESULTING FROM IN VITRO FERTILIZATION, FIRST TRIMESTER: ICD-10-CM

## 2022-04-20 DIAGNOSIS — O09.521 ADVANCED MATERNAL AGE IN MULTIGRAVIDA, FIRST TRIMESTER: ICD-10-CM

## 2022-04-20 DIAGNOSIS — Z67.91 RH NEGATIVE STATUS DURING PREGNANCY IN FIRST TRIMESTER: ICD-10-CM

## 2022-04-20 LAB
ABO/RH(D): NORMAL
AST SERPL W P-5'-P-CCNC: 19 U/L (ref 0–45)
B-HCG SERPL-ACNC: 3730 IU/L (ref 0–5)
B-HCG SERPL-ACNC: 3915 IU/L (ref 0–5)
BASOPHILS # BLD AUTO: 0.1 10E3/UL (ref 0–0.2)
BASOPHILS NFR BLD AUTO: 1 %
CREAT SERPL-MCNC: 0.66 MG/DL (ref 0.52–1.04)
EOSINOPHIL # BLD AUTO: 0.4 10E3/UL (ref 0–0.7)
EOSINOPHIL NFR BLD AUTO: 6 %
ERYTHROCYTE [DISTWIDTH] IN BLOOD BY AUTOMATED COUNT: 14.6 % (ref 10–15)
FBST KIT EXP: NORMAL
FBST LOT #: NORMAL
FETAL BLEED SCREEN: NORMAL
GFR SERPL CREATININE-BSD FRML MDRD: >90 ML/MIN/1.73M2
HCT VFR BLD AUTO: 35.6 % (ref 35–47)
HGB BLD-MCNC: 11.9 G/DL (ref 11.7–15.7)
IMM GRANULOCYTES # BLD: 0 10E3/UL
IMM GRANULOCYTES NFR BLD: 0 %
LYMPHOCYTES # BLD AUTO: 3.1 10E3/UL (ref 0.8–5.3)
LYMPHOCYTES NFR BLD AUTO: 41 %
MCH RBC QN AUTO: 28.3 PG (ref 26.5–33)
MCHC RBC AUTO-ENTMCNC: 33.4 G/DL (ref 31.5–36.5)
MCV RBC AUTO: 85 FL (ref 78–100)
MONOCYTES # BLD AUTO: 0.6 10E3/UL (ref 0–1.3)
MONOCYTES NFR BLD AUTO: 8 %
NEUTROPHILS # BLD AUTO: 3.3 10E3/UL (ref 1.6–8.3)
NEUTROPHILS NFR BLD AUTO: 44 %
NRBC # BLD AUTO: 0 10E3/UL
NRBC BLD AUTO-RTO: 0 /100
PLATELET # BLD AUTO: 496 10E3/UL (ref 150–450)
RBC # BLD AUTO: 4.2 10E6/UL (ref 3.8–5.2)
SPECIMEN EXPIRATION DATE: NORMAL
WBC # BLD AUTO: 7.5 10E3/UL (ref 4–11)

## 2022-04-20 PROCEDURE — 250N000009 HC RX 250: Performed by: NURSE ANESTHETIST, CERTIFIED REGISTERED

## 2022-04-20 PROCEDURE — 59899 UNLISTED PX MAT CARE&DLVR: CPT | Performed by: OBSTETRICS & GYNECOLOGY

## 2022-04-20 PROCEDURE — 82565 ASSAY OF CREATININE: CPT

## 2022-04-20 PROCEDURE — 258N000003 HC RX IP 258 OP 636: Performed by: NURSE ANESTHETIST, CERTIFIED REGISTERED

## 2022-04-20 PROCEDURE — 250N000011 HC RX IP 250 OP 636: Performed by: NURSE ANESTHETIST, CERTIFIED REGISTERED

## 2022-04-20 PROCEDURE — 250N000013 HC RX MED GY IP 250 OP 250 PS 637: Performed by: OBSTETRICS & GYNECOLOGY

## 2022-04-20 PROCEDURE — 250N000011 HC RX IP 250 OP 636: Performed by: ANESTHESIOLOGY

## 2022-04-20 PROCEDURE — 272N000001 HC OR GENERAL SUPPLY STERILE: Performed by: OBSTETRICS & GYNECOLOGY

## 2022-04-20 PROCEDURE — 59899 UNLISTED PX MAT CARE&DLVR: CPT | Mod: 80 | Performed by: OBSTETRICS & GYNECOLOGY

## 2022-04-20 PROCEDURE — 999N000141 HC STATISTIC PRE-PROCEDURE NURSING ASSESSMENT: Performed by: OBSTETRICS & GYNECOLOGY

## 2022-04-20 PROCEDURE — 370N000017 HC ANESTHESIA TECHNICAL FEE, PER MIN: Performed by: OBSTETRICS & GYNECOLOGY

## 2022-04-20 PROCEDURE — 258N000003 HC RX IP 258 OP 636: Performed by: ANESTHESIOLOGY

## 2022-04-20 PROCEDURE — 258N000001 HC RX 258: Performed by: OBSTETRICS & GYNECOLOGY

## 2022-04-20 PROCEDURE — 84702 CHORIONIC GONADOTROPIN TEST: CPT | Performed by: OBSTETRICS & GYNECOLOGY

## 2022-04-20 PROCEDURE — 86901 BLOOD TYPING SEROLOGIC RH(D): CPT | Performed by: OBSTETRICS & GYNECOLOGY

## 2022-04-20 PROCEDURE — 250N000011 HC RX IP 250 OP 636: Performed by: OBSTETRICS & GYNECOLOGY

## 2022-04-20 PROCEDURE — 84450 TRANSFERASE (AST) (SGOT): CPT

## 2022-04-20 PROCEDURE — 710N000009 HC RECOVERY PHASE 1, LEVEL 1, PER MIN: Performed by: OBSTETRICS & GYNECOLOGY

## 2022-04-20 PROCEDURE — 999N000063 US INTRAOPERATIVE: Mod: TC

## 2022-04-20 PROCEDURE — 36415 COLL VENOUS BLD VENIPUNCTURE: CPT | Performed by: OBSTETRICS & GYNECOLOGY

## 2022-04-20 PROCEDURE — 88331 PATH CONSLTJ SURG 1 BLK 1SPC: CPT | Mod: TC | Performed by: OBSTETRICS & GYNECOLOGY

## 2022-04-20 PROCEDURE — 710N000012 HC RECOVERY PHASE 2, PER MINUTE: Performed by: OBSTETRICS & GYNECOLOGY

## 2022-04-20 PROCEDURE — 360N000076 HC SURGERY LEVEL 3, PER MIN: Performed by: OBSTETRICS & GYNECOLOGY

## 2022-04-20 PROCEDURE — 250N000009 HC RX 250: Performed by: ANESTHESIOLOGY

## 2022-04-20 PROCEDURE — 250N000013 HC RX MED GY IP 250 OP 250 PS 637: Performed by: ANESTHESIOLOGY

## 2022-04-20 PROCEDURE — 85025 COMPLETE CBC W/AUTO DIFF WBC: CPT | Performed by: OBSTETRICS & GYNECOLOGY

## 2022-04-20 RX ORDER — KETOROLAC TROMETHAMINE 30 MG/ML
INJECTION, SOLUTION INTRAMUSCULAR; INTRAVENOUS PRN
Status: DISCONTINUED | OUTPATIENT
Start: 2022-04-20 | End: 2022-04-20

## 2022-04-20 RX ORDER — GLYCOPYRROLATE 0.2 MG/ML
INJECTION, SOLUTION INTRAMUSCULAR; INTRAVENOUS PRN
Status: DISCONTINUED | OUTPATIENT
Start: 2022-04-20 | End: 2022-04-20

## 2022-04-20 RX ORDER — ONDANSETRON 2 MG/ML
INJECTION INTRAMUSCULAR; INTRAVENOUS PRN
Status: DISCONTINUED | OUTPATIENT
Start: 2022-04-20 | End: 2022-04-20

## 2022-04-20 RX ORDER — DOXYCYCLINE 100 MG/1
100 CAPSULE ORAL ONCE
Status: COMPLETED | OUTPATIENT
Start: 2022-04-20 | End: 2022-04-20

## 2022-04-20 RX ORDER — HYDROMORPHONE HCL IN WATER/PF 6 MG/30 ML
0.4 PATIENT CONTROLLED ANALGESIA SYRINGE INTRAVENOUS EVERY 5 MIN PRN
Status: DISCONTINUED | OUTPATIENT
Start: 2022-04-20 | End: 2022-04-20 | Stop reason: HOSPADM

## 2022-04-20 RX ORDER — SODIUM CHLORIDE, SODIUM LACTATE, POTASSIUM CHLORIDE, CALCIUM CHLORIDE 600; 310; 30; 20 MG/100ML; MG/100ML; MG/100ML; MG/100ML
INJECTION, SOLUTION INTRAVENOUS CONTINUOUS
Status: DISCONTINUED | OUTPATIENT
Start: 2022-04-20 | End: 2022-04-20 | Stop reason: HOSPADM

## 2022-04-20 RX ORDER — DEXAMETHASONE SODIUM PHOSPHATE 4 MG/ML
INJECTION, SOLUTION INTRA-ARTICULAR; INTRALESIONAL; INTRAMUSCULAR; INTRAVENOUS; SOFT TISSUE PRN
Status: DISCONTINUED | OUTPATIENT
Start: 2022-04-20 | End: 2022-04-20

## 2022-04-20 RX ORDER — OXYCODONE HYDROCHLORIDE 5 MG/1
5 TABLET ORAL EVERY 4 HOURS PRN
Status: DISCONTINUED | OUTPATIENT
Start: 2022-04-20 | End: 2022-04-20 | Stop reason: HOSPADM

## 2022-04-20 RX ORDER — OXYCODONE HYDROCHLORIDE 5 MG/1
5 TABLET ORAL EVERY 6 HOURS PRN
Qty: 10 TABLET | Refills: 0 | Status: SHIPPED | OUTPATIENT
Start: 2022-04-20 | End: 2022-04-23

## 2022-04-20 RX ORDER — FENTANYL CITRATE 50 UG/ML
25 INJECTION, SOLUTION INTRAMUSCULAR; INTRAVENOUS EVERY 5 MIN PRN
Status: DISCONTINUED | OUTPATIENT
Start: 2022-04-20 | End: 2022-04-20 | Stop reason: HOSPADM

## 2022-04-20 RX ORDER — FENTANYL CITRATE 50 UG/ML
25 INJECTION, SOLUTION INTRAMUSCULAR; INTRAVENOUS
Status: DISCONTINUED | OUTPATIENT
Start: 2022-04-20 | End: 2022-04-20 | Stop reason: HOSPADM

## 2022-04-20 RX ORDER — NEOSTIGMINE METHYLSULFATE 1 MG/ML
VIAL (ML) INJECTION PRN
Status: DISCONTINUED | OUTPATIENT
Start: 2022-04-20 | End: 2022-04-20

## 2022-04-20 RX ORDER — PROPOFOL 10 MG/ML
INJECTION, EMULSION INTRAVENOUS PRN
Status: DISCONTINUED | OUTPATIENT
Start: 2022-04-20 | End: 2022-04-20

## 2022-04-20 RX ORDER — FENTANYL CITRATE 50 UG/ML
INJECTION, SOLUTION INTRAMUSCULAR; INTRAVENOUS PRN
Status: DISCONTINUED | OUTPATIENT
Start: 2022-04-20 | End: 2022-04-20

## 2022-04-20 RX ORDER — ONDANSETRON 2 MG/ML
4 INJECTION INTRAMUSCULAR; INTRAVENOUS EVERY 30 MIN PRN
Status: DISCONTINUED | OUTPATIENT
Start: 2022-04-20 | End: 2022-04-20 | Stop reason: HOSPADM

## 2022-04-20 RX ORDER — LABETALOL 20 MG/4 ML (5 MG/ML) INTRAVENOUS SYRINGE
PRN
Status: DISCONTINUED | OUTPATIENT
Start: 2022-04-20 | End: 2022-04-20

## 2022-04-20 RX ORDER — LABETALOL HYDROCHLORIDE 5 MG/ML
10 INJECTION, SOLUTION INTRAVENOUS EVERY 10 MIN PRN
Status: DISCONTINUED | OUTPATIENT
Start: 2022-04-20 | End: 2022-04-20 | Stop reason: HOSPADM

## 2022-04-20 RX ORDER — CEFAZOLIN SODIUM 1 G/3ML
INJECTION, POWDER, FOR SOLUTION INTRAMUSCULAR; INTRAVENOUS PRN
Status: DISCONTINUED | OUTPATIENT
Start: 2022-04-20 | End: 2022-04-20

## 2022-04-20 RX ORDER — KETOROLAC TROMETHAMINE 15 MG/ML
15 INJECTION, SOLUTION INTRAMUSCULAR; INTRAVENOUS
Status: DISCONTINUED | OUTPATIENT
Start: 2022-04-20 | End: 2022-04-20 | Stop reason: HOSPADM

## 2022-04-20 RX ORDER — ONDANSETRON 4 MG/1
4 TABLET, ORALLY DISINTEGRATING ORAL
Status: DISCONTINUED | OUTPATIENT
Start: 2022-04-20 | End: 2022-04-20 | Stop reason: HOSPADM

## 2022-04-20 RX ORDER — MEPERIDINE HYDROCHLORIDE 25 MG/ML
25 INJECTION INTRAMUSCULAR; INTRAVENOUS; SUBCUTANEOUS
Status: DISCONTINUED | OUTPATIENT
Start: 2022-04-20 | End: 2022-04-20 | Stop reason: HOSPADM

## 2022-04-20 RX ORDER — ACETAMINOPHEN 325 MG/1
650 TABLET ORAL
Status: DISCONTINUED | OUTPATIENT
Start: 2022-04-20 | End: 2022-04-20 | Stop reason: HOSPADM

## 2022-04-20 RX ORDER — ACETAMINOPHEN 325 MG/1
975 TABLET ORAL ONCE
Status: COMPLETED | OUTPATIENT
Start: 2022-04-20 | End: 2022-04-20

## 2022-04-20 RX ORDER — ONDANSETRON 4 MG/1
4 TABLET, ORALLY DISINTEGRATING ORAL EVERY 30 MIN PRN
Status: DISCONTINUED | OUTPATIENT
Start: 2022-04-20 | End: 2022-04-20 | Stop reason: HOSPADM

## 2022-04-20 RX ORDER — LIDOCAINE 40 MG/G
CREAM TOPICAL
Status: DISCONTINUED | OUTPATIENT
Start: 2022-04-20 | End: 2022-04-20 | Stop reason: HOSPADM

## 2022-04-20 RX ADMIN — ROCURONIUM BROMIDE 30 MG: 50 INJECTION, SOLUTION INTRAVENOUS at 12:29

## 2022-04-20 RX ADMIN — SODIUM CHLORIDE, POTASSIUM CHLORIDE, SODIUM LACTATE AND CALCIUM CHLORIDE: 600; 310; 30; 20 INJECTION, SOLUTION INTRAVENOUS at 10:16

## 2022-04-20 RX ADMIN — PHENYLEPHRINE HYDROCHLORIDE 100 MCG: 10 INJECTION INTRAVENOUS at 12:07

## 2022-04-20 RX ADMIN — FENTANYL CITRATE 100 MCG: 50 INJECTION, SOLUTION INTRAMUSCULAR; INTRAVENOUS at 12:59

## 2022-04-20 RX ADMIN — KETOROLAC TROMETHAMINE 30 MG: 30 INJECTION, SOLUTION INTRAMUSCULAR at 14:07

## 2022-04-20 RX ADMIN — SODIUM CHLORIDE, POTASSIUM CHLORIDE, SODIUM LACTATE AND CALCIUM CHLORIDE: 600; 310; 30; 20 INJECTION, SOLUTION INTRAVENOUS at 12:37

## 2022-04-20 RX ADMIN — CEFAZOLIN 2 G: 1 INJECTION, POWDER, FOR SOLUTION INTRAMUSCULAR; INTRAVENOUS at 12:57

## 2022-04-20 RX ADMIN — FENTANYL CITRATE 25 MCG: 50 INJECTION, SOLUTION INTRAMUSCULAR; INTRAVENOUS at 14:48

## 2022-04-20 RX ADMIN — ONDANSETRON HYDROCHLORIDE 4 MG: 2 INJECTION, SOLUTION INTRAVENOUS at 12:07

## 2022-04-20 RX ADMIN — NEOSTIGMINE METHYLSULFATE 3 MG: 1 INJECTION, SOLUTION INTRAVENOUS at 14:03

## 2022-04-20 RX ADMIN — LABETALOL 20 MG/4 ML (5 MG/ML) INTRAVENOUS SYRINGE 10 MG: at 13:24

## 2022-04-20 RX ADMIN — FENTANYL CITRATE 50 MCG: 50 INJECTION, SOLUTION INTRAMUSCULAR; INTRAVENOUS at 12:12

## 2022-04-20 RX ADMIN — OXYCODONE HYDROCHLORIDE 5 MG: 5 TABLET ORAL at 15:15

## 2022-04-20 RX ADMIN — DOXYCYCLINE HYCLATE 100 MG: 100 CAPSULE ORAL at 10:01

## 2022-04-20 RX ADMIN — MIDAZOLAM 2 MG: 1 INJECTION INTRAMUSCULAR; INTRAVENOUS at 11:48

## 2022-04-20 RX ADMIN — FENTANYL CITRATE 50 MCG: 50 INJECTION, SOLUTION INTRAMUSCULAR; INTRAVENOUS at 11:55

## 2022-04-20 RX ADMIN — PHENYLEPHRINE HYDROCHLORIDE 100 MCG: 10 INJECTION INTRAVENOUS at 12:16

## 2022-04-20 RX ADMIN — DEXAMETHASONE SODIUM PHOSPHATE 4 MG: 4 INJECTION, SOLUTION INTRA-ARTICULAR; INTRALESIONAL; INTRAMUSCULAR; INTRAVENOUS; SOFT TISSUE at 11:56

## 2022-04-20 RX ADMIN — FENTANYL CITRATE 25 MCG: 50 INJECTION, SOLUTION INTRAMUSCULAR; INTRAVENOUS at 14:53

## 2022-04-20 RX ADMIN — ACETAMINOPHEN 975 MG: 325 TABLET, FILM COATED ORAL at 10:01

## 2022-04-20 RX ADMIN — LIDOCAINE HYDROCHLORIDE 50 MG: 10 INJECTION, SOLUTION EPIDURAL; INFILTRATION; INTRACAUDAL; PERINEURAL at 11:55

## 2022-04-20 RX ADMIN — PHENYLEPHRINE HYDROCHLORIDE 200 MCG: 10 INJECTION INTRAVENOUS at 12:37

## 2022-04-20 RX ADMIN — GLYCOPYRROLATE 0.2 MCG: 0.2 INJECTION, SOLUTION INTRAMUSCULAR; INTRAVENOUS at 12:07

## 2022-04-20 RX ADMIN — HUMAN RHO(D) IMMUNE GLOBULIN 300 MCG: 1500 SOLUTION INTRAMUSCULAR; INTRAVENOUS at 17:03

## 2022-04-20 RX ADMIN — GLYCOPYRROLATE 0.4 MCG: 0.2 INJECTION, SOLUTION INTRAMUSCULAR; INTRAVENOUS at 14:03

## 2022-04-20 RX ADMIN — PROPOFOL 120 MG: 10 INJECTION, EMULSION INTRAVENOUS at 11:55

## 2022-04-20 NOTE — TELEPHONE ENCOUNTER
----- Message from Netta Deleon MD sent at 4/20/2022  4:17 PM CDT -----  Regarding: ectopic pregnancy  I managed an ectopic pregnancy for this patient laparoscopically today, however, due to location of the ectopic I think she would benefit from methotrexate therapy. Can you please set this up at the infusion center as a 2 dose regimen starting tomorrow?     MTX 50 mg/m2 (maximum dose 100 mg) = day 1  Second dose of MTX 50 mg/m2 on day 4.   Serum hCG levels are obtained on day 7       Thank you,  Netta Deleon MD

## 2022-04-20 NOTE — OP NOTE
[facility] Gynecology Operative Note    Date of Surgery: 2022  Patient: Sherry George  MRN: [id number]    Preoperative diagnosis:   - Missed  at 11w1d  - IVF pregnancy  - Fibroid uterus  - Endometriosis    Post-operative diagnosis:   - IVF pregnancy  - Fibroid uterus  - Endometriosis  - myometrial ectopic pregnancy    Procedure:   1. Exam under anesthesia  2. Suction Dilation and Curettage  3. Exploratory laparoscopy  4. Lysis of intraabdominal adhesions  5. Evacuation of ectopic pregnancy      Surgeon: Netta Deleon MD   Assistant: Cristian Marcial MD    Anesthesia: MAC  Fluids 800 mL   mL    Specimen:   1. endometrial curettings  2. Products of conceptio    Findings: Exam under anesthesia revealed an enlarged fixed uterus. Dilation and curettage under direct ultrasound guidance with scant tissue returned and ultrasound evidnece of persistent gestational sac separate from the endometrial cavity. On laparoscopy there was no evidence of injury to underlying structures at entry point. normal appearing upper abdominal anatomy. dense adhesions in the pelvis as well as inflammatory filmy adhesions of the lower uterine segment to underlying tissue including bowels. Multiple subserosal and intramural fibroids. spongy billowing area on the left lower uterine segment consistent with ultrasound findings for ectopic pregnancy. Products of conception identified on frozen section following laparoscopic evacuation of the myometrial pocket.    Indications: Sherry George is a 36 year old female  who presented with US confirmed absent cardiac activity at gestational age of 11w1d by LMP with CRL of 6w. Concern raised on ultrasound for cornual ectopic, however second read suggested distortion of the endometrium due to fibroid uterus with non-viable IUP. Fetal heart rate had been detected on outside 6w ultrasound. She passed a clot yesterday, and describes intense cramping since 2 weeks  after embryo transfer, but no new or different pain in the last week. She was counseled on options as well as risks and benefits of the procedure and preferred definite treatment with suction dilation and curettage.   Procedure: The patient was taken to the OR where a patient safety time out was performed and anesthesia was administered without difficulty. She was placed in the dorsal lithotomy position with yellow fin stirrups. Exam under anesthesia reveal the above findings. The patient was prepped and draped in usual sterile fashion.   A speculum was introduced into the vagina and used to visualize the cervix. A single-toothed tenaculum was used to grasp the anterior lip of the cervix in a horizontal fashion. Under ultrasound guidance, dilators were then used to dilate the cervix to 8mm. An 8mm curved curette was then introduced into the cavity after checking for adequate suction. Moderate tissue/blood was collected, however on ultrasound it was clear that the gestational sac was not disrupted by the D&C. The uterine contents were sent to pathology for frozen section and found to lack chorionic villi. An acorn manipulator was then placed and along with a garcia catheter and attention was turned to the abdominal cavity for further evaluation.     The patient was then prepped and draped again in the usual sterile fashion and a second time-out was performed. An 11-blade scalpel was used to make a 5 mm vertical incision in the umbilicus and a Michelle used to expand the incision. A Veress needle was used to access the peritoneum through the umbilical incision, and saline syringe returned no blood or stool and saline dripped rapidly into needle. CO2 gas was attached to the needle and opening pressure was less than 5 mmHg, and flow was increased to 20 L/min. Pneumoperitoneum was achieved with good tympany of the abdomen. A 5 mm trocar was used to place the first port. The 5 mm scope was placed in the port and visualized  the abdomen which was free of any injury at the site of entry. Upper abdomen was explored with no abnormal findings. Attention was turned to the uterus, ovaries, fallopian tubes, and lower abdominal walls. There were dense bowel adhesions to pelvic sidewalls and filmy, inflammatory adhesions from the posterior uterus to the underlying tissues.  Multiple subserosal and intramural myomas were noted and the uterus universally had a firm texture. The filmy adhesions were taken down bluntly and the posterior cul de sac was evaluated for signs of ectopic based on ultrasound findings. No gestational sac was detected in the pelvis. Bilateral rounds were followed from the pelvic side wall to location the cornua, the entire uterus was very distorted due to fibroids and scar tissue. No abnormalities detected at bilateral cornua.   Upon further evaluation of the posterior lower uterine segment the left side appeared to be soft and billowy. A laparoscopic needle was inserted into this area and with suction both fluid and blood were evacuated. An incision was made with the thunderbeat overlying this area and the pregnancy was evacuated with laparoscopic suction. The RLQ port was extended to 11mm.The thunderbeat was then used to cauterize the uterine incision which was then oversewn with 0-vlock suture with the Silverpop needle , good hemostasis was achieved. The cavity was copiously irrigated, scant oozing over posterior uterus at location of prior adhesions was controlled with surgicel powder.     Intra-op ultrasound was again obtained to visualize the uterus, no further gestational sac was visualized. The evacuated tissue was sent for frozen pathology and identified as products of conception.     The barron sofia was inserted to close the 11mm port with 0-vicryl. After thorough exploration the ports were removed under direct visualization, pneumoperitoneum was expelled. The skin incisions were closed using 4-0 vicryl and  dermabond. The speculum was reinserted into the vagina, the uterine manipulator removed, and the tenaculum removed from the cervix. Good hemostasis was noted.    Instrument, sponge, and needle counts were correct times 2. The patient was extubated in the operating room and transferred to the PACU in stable condition.      Dr. Marcial was asked to assist due to the complex nature of the case given suspected ectopic pregnancy of unknown location and history of frozen pelvis in the setting of endometriosis. He actively first assisted during this operation providing necessary retraction and exposure as well as maintaining hemostasis and a clear operative field. This was helpful in allowing the operation to proceed in a safe and expeditious manner. Dr. Marcial was present for the entire duration of the operation.          Netta Deleon MD  Obstetrics and Gynecology

## 2022-04-20 NOTE — ANESTHESIA POSTPROCEDURE EVALUATION
Patient: Sherry George    Procedure: Procedure(s):  DILATION AND CURETTAGE, UTERUS, USING SUCTION, WITH ULTRASOUND GUIDANCE  EXPLORATORY Laparoscopy WITH EVACUATION OF MYOMETRIAL ECTOPIC PREGNANCY       Anesthesia Type:  General    Note:     Postop Pain Control: Uneventful            Sign Out: Well controlled pain   PONV: No   Neuro/Psych: Uneventful            Sign Out: Acceptable/Baseline neuro status   Airway/Respiratory: Uneventful            Sign Out: Acceptable/Baseline resp. status   CV/Hemodynamics: Uneventful            Sign Out: Acceptable CV status   Other NRE: NONE   DID A NON-ROUTINE EVENT OCCUR? No           Last vitals:  Vitals Value Taken Time   /78 04/20/22 1430   Temp 97.3  F (36.3  C) 04/20/22 1421   Pulse 69 04/20/22 1439   Resp 8 04/20/22 1439   SpO2 100 % 04/20/22 1439   Vitals shown include unvalidated device data.    Electronically Signed By: Jj Reynolds MD  April 20, 2022  2:40 PM

## 2022-04-20 NOTE — ANESTHESIA PROCEDURE NOTES
Airway       Patient location during procedure: OR       Procedure Start/Stop Times: 4/20/2022 12:31 PM  Staff -        CRNA: Marcus Tellez APRN CRNA       Performed By: CRNA  Consent for Airway        Urgency: elective  Indications and Patient Condition       Indications for airway management: abigail-procedural and airway protection       Induction type:inhalational       Mask difficulty assessment: 0 - not attempted    Final Airway Details       Final airway type: endotracheal airway       Successful airway: ETT - single and Oral  Endotracheal Airway Details        ETT size (mm): 7.0       Cuffed: yes       Successful intubation technique: direct laryngoscopy       DL Blade Type: Collins 2       Grade View of Cords: 1       Adjucts: stylet       Position: Right       Measured from: lips       Secured at (cm): 22       Bite block used: None    Post intubation assessment        Placement verified by: capnometry, equal breath sounds and chest rise        Number of attempts at approach: 1       Secured with: plastic tape       Ease of procedure: easy       Dentition: Intact    Medication(s) Administered   Medication Administration Time: 4/20/2022 12:31 PM

## 2022-04-20 NOTE — ANESTHESIA CARE TRANSFER NOTE
Patient: Sherry George    Procedure: Procedure(s):  DILATION AND CURETTAGE, UTERUS, USING SUCTION, WITH ULTRASOUND GUIDANCE  EXPLORATORY Laparoscopy WITH EVACUATION OF MYOMETRIAL ECTOPIC PREGNANCY       Diagnosis: Pregnancy resulting from in vitro fertilization in first trimester [O09.811]  AMA (advanced maternal age) multigravida 35+, first trimester [O09.521]  Rh negative status during pregnancy in first trimester [O26.891, Z67.91]  Non-viable pregnancy [O02.89]  Diagnosis Additional Information: No value filed.    Anesthesia Type:   General     Note:    Oropharynx: oropharynx clear of all foreign objects and spontaneously breathing  Level of Consciousness: awake  Oxygen Supplementation: face mask  Level of Supplemental Oxygen (L/min / FiO2): 6  Independent Airway: airway patency satisfactory and stable  Dentition: dentition unchanged  Vital Signs Stable: post-procedure vital signs reviewed and stable  Report to RN Given: handoff report given  Patient transferred to: PACU    Handoff Report: Identifed the Patient, Identified the Reponsible Provider, Reviewed the pertinent medical history, Discussed the surgical course, Reviewed Intra-OP anesthesia mangement and issues during anesthesia, Set expectations for post-procedure period and Allowed opportunity for questions and acknowledgement of understanding      Vitals:  Vitals Value Taken Time   /71 04/20/22 1421   Temp 97.3  F (36.3  C) 04/20/22 1421   Pulse 86 04/20/22 1423   Resp 12 04/20/22 1423   SpO2 100 % 04/20/22 1423   Vitals shown include unvalidated device data.    Electronically Signed By: GRAHAM Javier CRNA  April 20, 2022  2:24 PM

## 2022-04-20 NOTE — TELEPHONE ENCOUNTER
Called infusion center. Charge RN, Rachael is gone for the day at infusion center.   Will call to arrange tomorrow.    I started the admit/therpy order set but may need assistance with orders from infusion RN.  I am unsure if we use the fixed multiple dose plan or the single dose plan.    I did order an hcg quant for day 7 to be done as well..  She will need a consent signed prior to administration.  She also needs AST and Creatinine added onto labs prior to methotrexate.  I called lab and they are able to add these onto the tubes they have from today.    Criss Reis, RN

## 2022-04-20 NOTE — ANESTHESIA PROCEDURE NOTES
Airway       Patient location during procedure: OR  Staff -        CRNA: Marcus Tellez APRN CRNA       Performed By: CRNA  Consent for Airway        Urgency: elective  Indications and Patient Condition       Indications for airway management: abigail-procedural       Induction type:intravenous       Mask difficulty assessment: 1 - vent by mask    Final Airway Details       Final airway type: supraglottic airway    Supraglottic Airway Details        Type: LMA       Brand: I-Gel       LMA size: 4    Post intubation assessment        Placement verified by: capnometry, equal breath sounds and chest rise        Number of attempts at approach: 1       Secured with: plastic tape       Ease of procedure: easy       Dentition: Intact

## 2022-04-20 NOTE — DISCHARGE INSTRUCTIONS
"Call the Doctor's office tomorrow, Thursday regarding an \"Infusion appointment\".    GENERAL ANESTHESIA OR SEDATION ADULT DISCHARGE INSTRUCTIONS   SPECIAL PRECAUTIONS FOR 24 HOURS AFTER SURGERY    IT IS NOT UNUSUAL TO FEEL LIGHT-HEADED OR FAINT, UP TO 24 HOURS AFTER SURGERY OR WHILE TAKING PAIN MEDICATION.  IF YOU HAVE THESE SYMPTOMS; SIT FOR A FEW MINUTES BEFORE STANDING AND HAVE SOMEONE ASSIST YOU WHEN YOU GET UP TO WALK OR USE THE BATHROOM.    YOU SHOULD REST AND RELAX FOR THE NEXT 24 HOURS AND YOU MUST MAKE ARRANGEMENTS TO HAVE SOMEONE STAY WITH YOU FOR AT LEAST 24 HOURS AFTER YOUR DISCHARGE.  AVOID HAZARDOUS AND STRENUOUS ACTIVITIES.  DO NOT MAKE IMPORTANT DECISIONS FOR 24 HOURS.    DO NOT DRIVE ANY VEHICLE OR OPERATE MECHANICAL EQUIPMENT FOR 24 HOURS FOLLOWING THE END OF YOUR SURGERY.  EVEN THOUGH YOU MAY FEEL NORMAL, YOUR REACTIONS MAY BE AFFECTED BY THE MEDICATION YOU HAVE RECEIVED.    DO NOT DRINK ALCOHOLIC BEVERAGES FOR 24 HOURS FOLLOWING YOUR SURGERY.    DRINK CLEAR LIQUIDS (APPLE JUICE, GINGER ALE, 7-UP, BROTH, ETC.).  PROGRESS TO YOUR REGULAR DIET AS YOU FEEL ABLE.    YOU MAY HAVE A DRY MOUTH, A SORE THROAT, MUSCLES ACHES OR TROUBLE SLEEPING.  THESE SHOULD GO AWAY AFTER 24 HOURS.    CALL YOUR DOCTOR FOR ANY OF THE FOLLOWING:  SIGNS OF INFECTION (FEVER, GROWING TENDERNESS AT THE SURGERY SITE, A LARGE AMOUNT OF DRAINAGE OR BLEEDING, SEVERE PAIN, FOUL-SMELLING DRAINAGE, REDNESS OR SWELLING.    IT HAS BEEN OVER 8 TO 10 HOURS SINCE SURGERY AND YOU ARE STILL NOT ABLE TO URINATE (PASS WATER).   DILATION AND CURETTAGE AND DILATION DISCHARGE INSTRUCTIONS      FOR THE FIRST 1 TO 2 DAYS    -You may feel drowsy and weak.  -Don't drive a car, drink alcohol or use machinery for 24 hours.  -Get plenty of rest.  -You may bathe or shower and climb stairs.  You can go back to other activities after your pain goes away.    -Eat light meals and drink plenty of liquids for the first 24 hours.  -If you feel sick to your " "stomach, stay in bed.  Drink small amounts of clear liquids like 7-Up, tea or soup.    FEVER AND DISCOMFORT  -You may have a low fever (less than 100.4F or 38C or belly cramps.  -Take acetaminophen (Tylenol), ibuprofen (Advil, Motrin) or aspirin as directed for pain.  Or, your doctor may give you pain medicine.  -A heating pad set to \"low\" or a hot water bottle can help with cramps and backaches.    BLEEDING AND DISCHARGE  -You may have some vaginal bleeding or discharge for up to 6 weeks.  -Don't use tampons during the first week.  -Avoid sex until your bleeding has completely stopped.    GENERAL TIPS TO HELP PREVENT INFECTION  -Take any medicine that you were given to prevent infection.  -Always wash your hands before touching your stitches or the area around your vagina.  If your hands don't look dirty, you may use an alcohol hand-rub to clean them.  -Keep your nails clean and short.    BREAST OR CHEST PAIN  Your body may still try to make milk for 3 to 4 days, even though the pregnancy has ended.  If this happens, your breasts or chest area may become hard and painful.  To help feel more comfortable, try the following tips  -Wear a tight-fitting bra (like a sports bra) day and night.  -Take Motrin (ibuprofen) for pain.  -Don't try to pump or express your milk unless you are interested in donating it to a milk bank.  We can give you information about how to do that.  -Avoid breast or chest stimulation (during sex, for example).  -Consider letting loved ones know if hugs are painful.  Your body will stop making milk in about a week.    RETURNING TO WORK  Your body may be ready to return to work after 24 hours.  But emotionally, many patients need days or weeks before they feel ready to go back.  Your doctor can write a note to excuse you from work.    Once you go back to work, be prepared for some adjustment time.  Many people may be unaware that your pregnancy has ended.  This can be difficult.  You may find it " helpful to tell a few close coworkers about your loss and ask them to quietly spread the word.    FOLLOW-UP VISITS  It is very important to make a follow-up visit with your doctor, even if you a feeling better.  Your doctor will tell you when you should come in.  -During this visit, we will make sure that you are healing physically and emotionally.  This is a good time to talk through your experience and discuss other follow-up visits.  -If you see a doctor in the next 12 weeks for something else, be sure to tell the doctor about your pregnancy and the date it ended.  -If you decide to get pregnant again, we urge you to talk to your provider early and often.  Talk about any concerns you may have, such as how to take care of yourself during another pregnancy.    WHEN TO SEEK MEDICAL HELP  -Call 911 or go to the Emergency Department if you have thoughts of harming yourself or others.  -Call your clinic if you have any of these issues:  Fever over 100.4F (38C) that lasts more than 2 days.  2.  You soak more than one maxi pad or sanitary napkin with blood within 1 hour.  3.  You pass blood clots larger than a golf ball.  4.  Discharge from your vagina that smells bad.  5.  Very bad pain.  6.  Problems coping with sadness, anxiety or depression.     You received Toradol, an IV form of Ibuprofen (Motrin) at 2:00pm  Do not take any Ibuprofen products until 8:00pm.   Maximum Ibuprofen/Motrin in 24 hours = 3,000 mg.  Maximum Acetaminophen/Tylenol in 24 hour s= 4,000 mg.      POST OPERATIVE DISCHARGE INSTRUCTIONS:  DIET  -We recommend eating slowly, eating small frequent meals throughout the day  -Stay well hydrated    ACTIVITY  -No lifting, pushing, pulling greater than 15 lbs and no strenuous exercise for 6 weeks   -No driving while on narcotic analgesics (i.e. Percocet, oxycodone, Vicodin) or for 2 weeks following surgery  - Nothing in the vagina for 6 weeks following surgery    WOUND CARE  -Inspect your wounds daily for  "signs of infection (increased redness, drainage, pain)  -Keep your wound clean and dry  -You may shower, but do not soak in tub or pool until your skin is completely healed     NOTIFY  Please contact the clinic for problems after discharge such as:  -Temperature > 101F, chills, rigors, dizziness  -Redness around or purulent drainage from wound  -Inability to tolerate diet, nausea or vomiting  -You stop passing gas, develop significant bloating, abdominal pain  -Have blood in stools/vomit  -Have severe diarrhea/constipation  -Any other questions or concerns.     Medication Instructions  Some of your medications may have changed. Please take only prescribed and resumed medications     FOLLOW-UP  1.  You will need to follow-up in 2-3 weeks    2.  Continue to take medications as prescribed after surgery.     3. You can taper your pain medications at home as soon as you start feeling better.   Loss Discharge Instructions   We suggest you see your doctor within one to two weeks to check on your physical and emotional recovery.  This will be a good time to talk thru your experience and discuss further follow-up.       The Blues and Grief  After delivery you will have hormone changes and strong emotions, often referred to as the \"baby blues\".  You may find yourself easily upset, tearful or angry.  In addition, you will be grieving for your own loss.  You may feel terribly tired and not want to face friends, family or new babies.    Your feelings will be hard to predict during this time.  You may have many ups and downs.  Be kind and patient with yourself.    No two people grieve the same way.  This is true of spouses and partners.  Try to have open communication, but don't depend solely on each other for support.  Reach out to friends, family, clergy and your health care providers.  You don't have to handle this alone.    Normal grief after a pregnancy or  loss often lasts for weeks or months.  Over time, " you will have more good days than bad ones.  The first year is usually the hardest as you face significant dates and events for the first time.    At first, your sadness or anxiety may keep you from sleeping, eating, being with others or getting out of the house.  If, after a week, you aren't able to take care of basic daily tasks, please call your care provider right away.  It could be more serious than the blues or grief.  Going back to work:  Even though you did not bring home a living baby, you and your partner have a right to any family and bereavement leave benefits your employer offers.  When you do return to work, be prepared for some adjustment time.    Call your health care provider if you have any of these symptoms:  You soak a sanitary pad with blood within 1 hour, or you see blood clots larger than a golf ball.  Bleeding that lasts more than 6 weeks.  Vaginal discharge that smells bad.   A fever above 100.4 F (38 C) or higher (temperature taken under your tongue), with or without chills.  Severe, pain, cramping or tenderness in your lower belly area.  Pain that increases or does not go away from an episiotomy or perineal tear.  Increases pain, swelling, redness or fluid around your stitches.  A more frequent or urgent need to urinate (pee), or it burns when you pee.  Redness, swelling or pain around a vein in your leg.  Problems with coping with sadness, anxiety, or depression.  Your breasts are engorged (hard and swollen), red and very tender and you have a fever.   Nausea and vomiting.  Chest pain and cough or are gasping for air.  You have questions or concerns after you return home.    Keep your hands clean:  Always wash your hands before touching your perineal area and stitches.  This helps reduce your risk of infection.  If your hands aren't dirty, you may use an alcohol hand-rub to clean your hands. Keep your nails clean and short.

## 2022-04-20 NOTE — ANESTHESIA PREPROCEDURE EVALUATION
Anesthesia Pre-Procedure Evaluation    Patient: Sherry George   MRN: [id number] : 1985        Procedure : Procedure(s):  DILATION AND CURETTAGE, UTERUS, USING SUCTION, WITH ULTRASOUND GUIDANCE  Possible LAPAROSCOPY          Past Medical History:   Diagnosis Date     Endometriosis      Infertility, female      Intramural leiomyoma of uterus      Other ectopic pregnancy without intrauterine pregnancy 2020     Varicella       Past Surgical History:   Procedure Laterality Date     DAVINCI LYSIS OF ADHESIONS Right 3/19/2021    Procedure: LYSIS, ADHESIONS, ROBOT-ASSISTED, WITH RIGHT PARATUBAL CYSTECTOMY AND MYOMECTOMY;  Surgeon: Eileen Nelson MD;  [facility]     HYSTEROSCOPY DIAGNOSTIC N/A 3/19/2021    Procedure: DIAGNOSTIC HYSTEROSCOPY;  Surgeon: Eileen Nelson MD;  [facility]     LAPAROSCOPIC EVACUATION ECTOPIC PREGNANCY Right 2020    Procedure: pelviscopy, right salpingectomy;  Surgeon: Kimberly Magana MD;  Location[facility]     SALPINGECTOMY Bilateral 2020    Procedure: PELVISCOPY, BILATERAL SALPINGECTOMY, LYSIS OF ADHESIONS;  Surgeon: Kimberly Magana MD;  Location[facility]      Allergies   Allergen Reactions     Miconazole Swelling      Social History     Tobacco Use     Smoking status: Never Smoker     Smokeless tobacco: Never Used   Substance Use Topics     Alcohol use: Not Currently      Wt Readings from Last 1 Encounters:   22 61.6 kg (135 lb 14.4 oz)        Anesthesia Evaluation   Pt has had prior anesthetic. Type: General.    No history of anesthetic complications       ROS/MED HX  ENT/Pulmonary:  - neg pulmonary ROS     Neurologic:  - neg neurologic ROS     Cardiovascular:  - neg cardiovascular ROS     METS/Exercise Tolerance:     Hematologic:  - neg hematologic  ROS     Musculoskeletal:  - neg musculoskeletal ROS     GI/Hepatic:  - neg GI/hepatic ROS     Renal/Genitourinary:  - neg Renal ROS     Endo:  - neg endo ROS     Psychiatric/Substance Use:  - neg  psychiatric ROS     Infectious Disease:  - neg infectious disease ROS     Malignancy:  - neg malignancy ROS     Other:  - neg other ROS          Physical Exam    Airway        Mallampati: III   TM distance: > 3 FB   Neck ROM: full   Mouth opening: > 3 cm    Respiratory Devices and Support         Dental  no notable dental history         Cardiovascular   cardiovascular exam normal          Pulmonary   pulmonary exam normal                OUTSIDE LABS:  CBC:   Lab Results   Component Value Date    WBC 7.5 04/20/2022    WBC 7.9 10/03/2021    HGB 11.9 04/20/2022    HGB 12.9 10/03/2021    HCT 35.6 04/20/2022    HCT 38.7 10/03/2021     (H) 04/20/2022     10/03/2021     BMP:   Lab Results   Component Value Date     10/03/2021     08/11/2020    POTASSIUM 3.5 10/03/2021    POTASSIUM 4.1 08/11/2020    CHLORIDE 107 10/03/2021    CHLORIDE 106 08/11/2020    CO2 24 10/03/2021    CO2 29 08/11/2020    BUN 8 10/03/2021    BUN 10 08/11/2020    CR 0.78 10/03/2021    CR 0.76 08/11/2020    GLC 87 10/03/2021    GLC 94 08/11/2020     COAGS: No results found for: PTT, INR, FIBR  POC:   Lab Results   Component Value Date    BGM 96 03/19/2021    HCGS Negative 03/19/2021     HEPATIC:   Lab Results   Component Value Date    ALBUMIN 3.2 (L) 10/03/2021    PROTTOTAL 7.9 10/03/2021    ALT 21 10/03/2021    AST 12 10/03/2021    ALKPHOS 47 10/03/2021    BILITOTAL 0.3 10/03/2021     OTHER:   Lab Results   Component Value Date    JUNI 8.3 (L) 10/03/2021    TSH 0.66 10/03/2021    T4 0.95 10/03/2021       Anesthesia Plan    ASA Status:  1   NPO Status:  NPO Appropriate    Anesthesia Type: General.     - Airway: LMA   Induction: Intravenous, Propofol.   Maintenance: Balanced.        Consents    Anesthesia Plan(s) and associated risks, benefits, and realistic alternatives discussed. Questions answered and patient/representative(s) expressed understanding.    - Discussed:     - Discussed with:  Patient         Postoperative  Care    Pain management: IV analgesics, Oral pain medications, Multi-modal analgesia.   PONV prophylaxis: Ondansetron (or other 5HT-3), Dexamethasone or Solumedrol     Comments:                Jj Reynolds MD

## 2022-04-20 NOTE — BRIEF OP NOTE
[facility]    Brief Operative Note    Pre-operative diagnosis: Pregnancy resulting from in vitro fertilization in first trimester [O09.811]  AMA (advanced maternal age) multigravida 35+, first trimester [O09.521]  Rh negative status during pregnancy in first trimester [O26.891, Z67.91]  Non-viable pregnancy [O02.89]  Post-operative diagnosis Lower uterine segment myometrial ectopic pregnancy    Procedure: Procedure(s):  DILATION AND CURETTAGE, UTERUS, USING SUCTION, WITH ULTRASOUND GUIDANCE  EXPLORATORY Laparoscopy WITH EVACUATION OF MYOMETRIAL ECTOPIC PREGNANCY  Surgeon: Surgeon(s) and Role:     * Netta Deleon MD - Primary     * Joshua Marcial MD - Assisting  Anesthesia: General   Estimated Blood Loss: 400 mL from 4/20/2022 11:52 AM to 4/20/2022  2:21 PM      Drains: None  Specimens:   ID Type Source Tests Collected by Time Destination   1 : PRODUCTS OF CONCEPTION - SURGICAL Products of Conception Uterus SURGICAL PATHOLOGY EXAM Netta Deleon MD 4/20/2022 12:36 PM    2 : PRODUCTS OF CONCEPTION - PASSED AT HOME  Products of Conception Uterus SURGICAL PATHOLOGY EXAM Netta Deleon MD 4/20/2022 12:37 PM    3 : PRODUCTS OF CONCEPTION Products of Conception Uterus SURGICAL PATHOLOGY EXAM Netta Deleon MD 4/20/2022  1:41 PM      Findings:   Exam under anesthesia revealed an enlarged fixed uterus. Dilation and curettage under direct ultrasound guidance with scant tissue returned and ultrasound evidnece of persistent gestational sac separate from the endometrial cavity. On laparoscopy there was no evidence of injury to underlying structures at entry point. normal appearing upper abdominal anatomy. dense adhesions in the pelvis as well as inflammatory filmy adhesions of the lower uterine segment to underlying tissue including bowels. Multiple subserosal and intramural fibroids. spongy billowing area on the left lower uterine segment consistent with ultrasound findings for  ectopic pregnancy. Products of conception identified on frozen section following laparoscopic evacuation of the myometrial pocket. .  Complications: None.  Implants: * No implants in log *    Netta Deleon MD

## 2022-04-21 ENCOUNTER — INFUSION THERAPY VISIT (OUTPATIENT)
Dept: INFUSION THERAPY | Facility: CLINIC | Age: 37
End: 2022-04-21
Attending: OBSTETRICS & GYNECOLOGY
Payer: COMMERCIAL

## 2022-04-21 VITALS
HEART RATE: 106 BPM | TEMPERATURE: 98.2 F | OXYGEN SATURATION: 100 % | SYSTOLIC BLOOD PRESSURE: 112 MMHG | RESPIRATION RATE: 16 BRPM | HEIGHT: 64 IN | DIASTOLIC BLOOD PRESSURE: 83 MMHG | WEIGHT: 138.9 LBS | BODY MASS INDEX: 23.71 KG/M2

## 2022-04-21 DIAGNOSIS — O00.80 OTHER ECTOPIC PREGNANCY WITHOUT INTRAUTERINE PREGNANCY: Primary | ICD-10-CM

## 2022-04-21 DIAGNOSIS — O00.109 TUBAL ECTOPIC PREGNANCY, UNSPECIFIED LATERALITY, UNSPECIFIED WHETHER INTRAUTERINE PREGNANCY PRESENT: ICD-10-CM

## 2022-04-21 LAB
PATH REPORT.COMMENTS IMP SPEC: NORMAL
PATH REPORT.COMMENTS IMP SPEC: NORMAL
PATH REPORT.FINAL DX SPEC: NORMAL
PATH REPORT.GROSS SPEC: NORMAL
PATH REPORT.INTRAOP OBS SPEC DOC: NORMAL
PATH REPORT.MICROSCOPIC SPEC OTHER STN: NORMAL
PATH REPORT.RELEVANT HX SPEC: NORMAL
PHOTO IMAGE: NORMAL

## 2022-04-21 PROCEDURE — 96401 CHEMO ANTI-NEOPL SQ/IM: CPT

## 2022-04-21 PROCEDURE — 88331 PATH CONSLTJ SURG 1 BLK 1SPC: CPT | Mod: 26 | Performed by: PATHOLOGY

## 2022-04-21 PROCEDURE — 88305 TISSUE EXAM BY PATHOLOGIST: CPT | Mod: 26 | Performed by: PATHOLOGY

## 2022-04-21 PROCEDURE — 250N000011 HC RX IP 250 OP 636: Performed by: OBSTETRICS & GYNECOLOGY

## 2022-04-21 RX ORDER — EPINEPHRINE 1 MG/ML
0.3 INJECTION, SOLUTION, CONCENTRATE INTRAVENOUS EVERY 5 MIN PRN
Status: CANCELLED | OUTPATIENT
Start: 2022-04-21

## 2022-04-21 RX ORDER — METHOTREXATE 25 MG/ML
50 INJECTION, SOLUTION INTRA-ARTERIAL; INTRAMUSCULAR; INTRATHECAL; INTRAVENOUS DAILY
Status: CANCELLED | OUTPATIENT
Start: 2022-04-21

## 2022-04-21 RX ORDER — ALBUTEROL SULFATE 0.83 MG/ML
2.5 SOLUTION RESPIRATORY (INHALATION)
Status: CANCELLED | OUTPATIENT
Start: 2022-04-21

## 2022-04-21 RX ORDER — METHYLPREDNISOLONE SODIUM SUCCINATE 125 MG/2ML
125 INJECTION, POWDER, LYOPHILIZED, FOR SOLUTION INTRAMUSCULAR; INTRAVENOUS
Status: CANCELLED
Start: 2022-04-21

## 2022-04-21 RX ORDER — ALBUTEROL SULFATE 90 UG/1
1-2 AEROSOL, METERED RESPIRATORY (INHALATION)
Status: CANCELLED
Start: 2022-04-21

## 2022-04-21 RX ORDER — NALOXONE HYDROCHLORIDE 0.4 MG/ML
0.2 INJECTION, SOLUTION INTRAMUSCULAR; INTRAVENOUS; SUBCUTANEOUS
Status: CANCELLED | OUTPATIENT
Start: 2022-04-21

## 2022-04-21 RX ORDER — DIPHENHYDRAMINE HYDROCHLORIDE 50 MG/ML
50 INJECTION INTRAMUSCULAR; INTRAVENOUS
Status: CANCELLED
Start: 2022-04-21

## 2022-04-21 RX ORDER — EPINEPHRINE 1 MG/ML
0.3 INJECTION, SOLUTION INTRAMUSCULAR; SUBCUTANEOUS EVERY 5 MIN PRN
Status: CANCELLED | OUTPATIENT
Start: 2022-04-21

## 2022-04-21 RX ORDER — METHOTREXATE 25 MG/ML
85 INJECTION, SOLUTION INTRA-ARTERIAL; INTRAMUSCULAR; INTRATHECAL; INTRAVENOUS DAILY
Status: COMPLETED | OUTPATIENT
Start: 2022-04-21 | End: 2022-04-21

## 2022-04-21 RX ADMIN — METHOTREXATE 85 MG: 25 INJECTION INTRA-ARTERIAL; INTRAMUSCULAR; INTRATHECAL; INTRAVENOUS at 13:56

## 2022-04-21 ASSESSMENT — PAIN SCALES - GENERAL: PAINLEVEL: MILD PAIN (3)

## 2022-04-21 NOTE — TELEPHONE ENCOUNTER
Spoke with Dr Deleon.  VO  Okay to do 1 dose. HCG quant on day 4 and then will see if needs another dose.  Had rhogam on 4/20. Does not need another dose.  Therapy plan started and signed.      Appt scheduled today at 1:15 @ [facility]    Patient advised of plan, appt dates and times.  Verbalized understanding.    Will stop by clinic at 12:45 to sign consent.  To have HCG quant done on 4/24 at [facility] lab. (orders placed)        Leanne Ron RN

## 2022-04-21 NOTE — TELEPHONE ENCOUNTER
Pt here and signed consent, no questions.  Directed to infusion center.   Consent faxed to infusion center and sent to scanning.    Criss Reis RN

## 2022-04-21 NOTE — PROGRESS NOTES
Infusion Nursing Note:  Sherry George presents today for Methotrexate injection.    Patient seen by provider today: No   present during visit today: Not Applicable.    Note: Drug information from [facility] and the Ectopic Pregnancy folder given to and reviewed with patient.  Potential SE reviewed.  Patient verbalized understanding.  Indicated she has had nausea with Methotrexate in the past.  Will call her MD if she feels like she needs something to control the nausea.  RH negative but received Rhogam yesterday.      Intravenous Access:  No Intravenous access/labs at this visit.    Treatment Conditions:  Results reviewed, labs MET treatment parameters, ok to proceed with treatment.      Post Infusion Assessment:  Patient tolerated injection without incident.       Discharge Plan:   Discharge instructions reviewed with: Patient.  Patient discharged in stable condition accompanied by: self.  Departure Mode: Ambulatory.  Will plan to check her labs on Sunday and will f/u with MD next wk.      EDWINA YO RN

## 2022-04-24 ENCOUNTER — LAB (OUTPATIENT)
Dept: LAB | Facility: CLINIC | Age: 37
End: 2022-04-24
Payer: COMMERCIAL

## 2022-04-24 DIAGNOSIS — O00.90 ECTOPIC PREGNANCY: ICD-10-CM

## 2022-04-24 LAB — B-HCG SERPL-ACNC: 476 IU/L (ref 0–5)

## 2022-04-24 PROCEDURE — 36415 COLL VENOUS BLD VENIPUNCTURE: CPT

## 2022-04-24 PROCEDURE — 84702 CHORIONIC GONADOTROPIN TEST: CPT

## 2022-04-25 DIAGNOSIS — O00.80 OTHER ECTOPIC PREGNANCY WITHOUT INTRAUTERINE PREGNANCY: Primary | ICD-10-CM

## 2022-04-25 DIAGNOSIS — O03.9 MISCARRIAGE: ICD-10-CM

## 2022-04-25 NOTE — RESULT ENCOUNTER NOTE
Please call Sherry Barrera to let her know her HCG is falling appropriately. She will need repeat HCG quant weekly until negative. Please place orders.     Netta Deleon MD

## 2022-05-01 ENCOUNTER — LAB (OUTPATIENT)
Dept: LAB | Facility: CLINIC | Age: 37
End: 2022-05-01
Payer: COMMERCIAL

## 2022-05-01 DIAGNOSIS — O03.9 MISCARRIAGE: ICD-10-CM

## 2022-05-01 LAB — B-HCG SERPL-ACNC: 117 IU/L (ref 0–5)

## 2022-05-01 PROCEDURE — 84702 CHORIONIC GONADOTROPIN TEST: CPT

## 2022-05-01 PROCEDURE — 36415 COLL VENOUS BLD VENIPUNCTURE: CPT

## 2022-05-10 ENCOUNTER — LAB (OUTPATIENT)
Dept: LAB | Facility: CLINIC | Age: 37
End: 2022-05-10
Payer: COMMERCIAL

## 2022-05-10 DIAGNOSIS — O03.9 MISCARRIAGE: ICD-10-CM

## 2022-05-10 PROCEDURE — 36415 COLL VENOUS BLD VENIPUNCTURE: CPT

## 2022-05-10 PROCEDURE — 84702 CHORIONIC GONADOTROPIN TEST: CPT

## 2022-05-11 LAB — B-HCG SERPL-ACNC: 26 IU/L (ref 0–5)

## 2022-05-22 ENCOUNTER — LAB (OUTPATIENT)
Dept: LAB | Facility: CLINIC | Age: 37
End: 2022-05-22
Payer: COMMERCIAL

## 2022-05-22 DIAGNOSIS — O03.9 MISCARRIAGE: ICD-10-CM

## 2022-05-22 LAB — B-HCG SERPL-ACNC: 12 IU/L (ref 0–5)

## 2022-05-22 PROCEDURE — 84702 CHORIONIC GONADOTROPIN TEST: CPT

## 2022-05-22 PROCEDURE — 36415 COLL VENOUS BLD VENIPUNCTURE: CPT

## 2022-05-24 ENCOUNTER — MYC MEDICAL ADVICE (OUTPATIENT)
Dept: OBGYN | Facility: CLINIC | Age: 37
End: 2022-05-24
Payer: COMMERCIAL

## 2022-05-24 DIAGNOSIS — Z30.41 ENCOUNTER FOR SURVEILLANCE OF CONTRACEPTIVE PILLS: Primary | ICD-10-CM

## 2022-05-29 ENCOUNTER — LAB (OUTPATIENT)
Dept: LAB | Facility: CLINIC | Age: 37
End: 2022-05-29
Payer: COMMERCIAL

## 2022-05-29 DIAGNOSIS — O03.9 MISCARRIAGE: ICD-10-CM

## 2022-05-29 LAB — B-HCG SERPL-ACNC: 6 IU/L (ref 0–5)

## 2022-05-29 PROCEDURE — 84702 CHORIONIC GONADOTROPIN TEST: CPT

## 2022-05-29 PROCEDURE — 36415 COLL VENOUS BLD VENIPUNCTURE: CPT

## 2022-05-31 ENCOUNTER — MYC MEDICAL ADVICE (OUTPATIENT)
Dept: OBGYN | Facility: CLINIC | Age: 37
End: 2022-05-31
Payer: COMMERCIAL

## 2022-05-31 DIAGNOSIS — Z30.41 ENCOUNTER FOR SURVEILLANCE OF CONTRACEPTIVE PILLS: Primary | ICD-10-CM

## 2022-05-31 DIAGNOSIS — Z98.890 S/P LAPAROSCOPY: ICD-10-CM

## 2022-06-01 RX ORDER — DROSPIRENONE AND ETHINYL ESTRADIOL 0.02-3(28)
1 KIT ORAL DAILY
Qty: 84 TABLET | Refills: 3 | Status: SHIPPED | OUTPATIENT
Start: 2022-06-01 | End: 2022-09-07

## 2022-06-02 RX ORDER — DROSPIRENONE AND ETHINYL ESTRADIOL 0.02-3(28)
1 KIT ORAL DAILY
Qty: 84 TABLET | Refills: 3 | Status: SHIPPED | OUTPATIENT
Start: 2022-06-02

## 2022-06-05 ENCOUNTER — APPOINTMENT (OUTPATIENT)
Dept: LAB | Facility: CLINIC | Age: 37
End: 2022-06-05
Payer: COMMERCIAL

## 2022-06-06 ENCOUNTER — LAB (OUTPATIENT)
Dept: LAB | Facility: CLINIC | Age: 37
End: 2022-06-06
Payer: COMMERCIAL

## 2022-06-06 DIAGNOSIS — Z30.41 ENCOUNTER FOR SURVEILLANCE OF CONTRACEPTIVE PILLS: ICD-10-CM

## 2022-06-06 LAB — B-HCG SERPL-ACNC: <1 IU/L (ref 0–5)

## 2022-06-06 PROCEDURE — 36415 COLL VENOUS BLD VENIPUNCTURE: CPT

## 2022-06-06 PROCEDURE — 84702 CHORIONIC GONADOTROPIN TEST: CPT

## 2022-09-07 ENCOUNTER — OFFICE VISIT (OUTPATIENT)
Dept: OBGYN | Facility: CLINIC | Age: 37
End: 2022-09-07
Payer: COMMERCIAL

## 2022-09-07 VITALS
DIASTOLIC BLOOD PRESSURE: 70 MMHG | HEIGHT: 64 IN | WEIGHT: 134 LBS | SYSTOLIC BLOOD PRESSURE: 118 MMHG | BODY MASS INDEX: 22.88 KG/M2

## 2022-09-07 DIAGNOSIS — N80.9 ENDOMETRIOSIS: ICD-10-CM

## 2022-09-07 DIAGNOSIS — Z01.419 ENCOUNTER FOR GYNECOLOGICAL EXAMINATION WITHOUT ABNORMAL FINDING: Primary | ICD-10-CM

## 2022-09-07 DIAGNOSIS — Z12.4 SCREENING FOR CERVICAL CANCER: ICD-10-CM

## 2022-09-07 PROCEDURE — 99395 PREV VISIT EST AGE 18-39: CPT | Performed by: OBSTETRICS & GYNECOLOGY

## 2022-09-07 PROCEDURE — 87624 HPV HI-RISK TYP POOLED RSLT: CPT | Performed by: OBSTETRICS & GYNECOLOGY

## 2022-09-07 PROCEDURE — G0145 SCR C/V CYTO,THINLAYER,RESCR: HCPCS | Performed by: OBSTETRICS & GYNECOLOGY

## 2022-09-07 NOTE — PROGRESS NOTES
SUBJECTIVE:                                                   Sherry George is a 37 year old  female who presents to clinic today for preventive health exam.   Periods are absent on continuous use oral contraceptives for treatment for Stage IV endometriosis. No intermenstrual bleeding, spotting, or discharge. no dyspareunia.     Sherry Barrera has a history of stage IV endometriosis and has undergone IVF for infertility which has resulted in ectopic pregnancy x2. She is now interested in pursuing fertility again. We reviewed my concerns for her health and safety with another attempt at pregnancy herself. She is interested in finding out more about surrogacy and has considered adoption. She will contact her [facility] clinic with regards to surrogacy resources.    Current contraception: oral contraceptives    Exercise: walking    ROS:  Breast: no nipple discharge, lumps, skin changes    No pap results on file, prior testing in [place] and a single test in [place] 3-4 years ago. She reports no abnormals.     Family history of breast CA: No  Family history of uterine/ovarian CA: No  Family history of colon CA: No    Patient Active Problem List   Diagnosis     Ectopic pregnancy, tubal     Tubal ectopic pregnancy, unspecified laterality, unspecified whether intrauterine pregnancy present     Other ectopic pregnancy without intrauterine pregnancy     Ectopic pregnancy without intrauterine pregnancy, unspecified location     History of ectopic pregnancy     Submucous leiomyoma of uterus     Past Surgical History:   Procedure Laterality Date     DAVINCI LYSIS OF ADHESIONS Right 3/19/2021    Procedure: LYSIS, ADHESIONS, ROBOT-ASSISTED, WITH RIGHT PARATUBAL CYSTECTOMY AND MYOMECTOMY;  Surgeon: Eileen Nelson MD;  [facility]     DILATION AND CURETTAGE SUCTION WITH ULTRASOUND GUIDANCE N/A 2022    Procedure: DILATION AND CURETTAGE, UTERUS, USING SUCTION, WITH ULTRASOUND GUIDANCE;  Surgeon: Netta Deleon,  MD;  [facility]     HYSTEROSCOPY DIAGNOSTIC N/A 3/19/2021    Procedure: DIAGNOSTIC HYSTEROSCOPY;  Surgeon: Eileen Nelson MD;  [facility]     LAPAROSCOPIC EVACUATION ECTOPIC PREGNANCY Right 7/29/2020    Procedure: pelviscopy, right salpingectomy;  Surgeon: Kimberly Magana MD;  [facility]     LAPAROSCOPY DIAGNOSTIC (GYN) N/A 4/20/2022    Procedure: EXPLORATORY Laparoscopy WITH EVACUATION OF MYOMETRIAL ECTOPIC PREGNANCY;  Surgeon: Netta Deleon MD;  [facility]     SALPINGECTOMY Bilateral 7/29/2020    Procedure: PELVISCOPY, BILATERAL SALPINGECTOMY, LYSIS OF ADHESIONS;  Surgeon: Kimberly Magana MD;  Location[facility]      Social History     Tobacco Use     Smoking status: Never Smoker     Smokeless tobacco: Never Used   Substance Use Topics     Alcohol use: Not Currently      Problem (# of Occurrences) Relation (Name,Age of Onset)    Bone Cancer (1) Paternal Grandmother    Diabetes (1) Maternal Grandmother    Family History Negative (2) Mother, Sister    Hyperlipidemia (1) Father       Negative family history of: Breast Cancer, Ovarian Cancer, Colon Cancer, Fibroids, Endometriosis            drospirenone-ethinyl estradiol (JENNIFFER) 3-0.02 MG tablet, Take 1 tablet by mouth daily  Multiple Vitamins-Minerals (MULTIVITAMIN ADULTS PO),   acetaminophen (TYLENOL) 325 MG tablet, Take 3 tablets (975 mg) by mouth every 6 hours as needed for mild pain (Patient not taking: Reported on 9/7/2022)  Prenatal Vit-Fe Fumarate-FA (PRENATAL MULTIVITAMIN  PLUS IRON) 27-1 MG TABS, Take 1 tablet by mouth daily (Patient not taking: No sig reported)  VITAMIN D PO, Take 1,000 Units by mouth daily (Patient not taking: No sig reported)  [DISCONTINUED] estradiol (VIVELLE-DOT) 0.1 MG/24HR bi-weekly patch, Place 1 patch onto the skin twice a week    No current facility-administered medications on file prior to visit.    Allergies   Allergen Reactions     Miconazole Swelling       Problem list and histories reviewed and  "adjusted as indicated.     OBJECTIVE:   /70 (BP Location: Right arm, Patient Position: Chair, Cuff Size: Adult Regular)   Ht 1.626 m (5' 4\")   Wt 60.8 kg (134 lb)   Breastfeeding No   BMI 23.00 kg/m      Gen: Healthy appearing female, no acute distress, comfortable  HENT: No scleral injection or icterus, neck supple  CV: Regular rate, well perfused  Resp: CTAB, normal work of breathing, no cough  Breast Exam: No visible masses or suspicious skin changes.  No discrete or dominant masses to palpation.  No axillary lymphadenopathy.  GI: Abdomen soft, non-tender. No masses, organomegaly  Skin: No suspicious lesions or rashes  Psychiatric: mentation appears normal and affect bright    : External genitalia normal well-estrogenized, healthy tissue.  No obvious excoriations, lesions, or rashes. Bartholins, urethra, skeins normal.  Normal pink vaginal mucosa.  SSE: Normal cervix, normal physiologic discharge.   Bimanual: No CMT, fixed, slightly enlarged uterus. No adnexal masses or tenderness appreciated.     ASSESSMENT/PLAN:                                                    Sherry George is a 37 year old female  with satisfactory annual exam.    PLAN:  Dx:  1)  Cervical cancer screening: obtained today  2)  Contraception: on marilu, has a 1 year supply  3)  Fertility: in the setting of dense pelvic disease, 2 prior ectopic pregnancies (the later within the uterine wall following IVF) and 1 remaining embryo, recommended she consider surrogacy. She will contact [facility] for further information.       PE:  Reviewed health maintenance including diet, regular exercise   and periodic exams.    Return to clinic in 1 year for routine health maintenance.    COUNSELING:   Reviewed preventive health counseling, as reflected in patient instructions   reports that she has never smoked. She has never used smokeless tobacco.        Netta Deleon MD   Obstetrics and Gynecology      "

## 2022-09-07 NOTE — PATIENT INSTRUCTIONS
Preventive Health Recommendations  Female Ages 26 - 39  Yearly exam:    See your health care provider every year in order to  Review health changes.    Discuss preventive care.     Review your medicines if you your doctor has prescribed any.    Until age 30: Get a Pap test every three years (more often if you have had an abnormal result).    After age 30: Talk to your doctor about whether you should have a Pap test every 3 years or have a Pap test with HPV screening every 5 years.    You do not need a Pap test if your uterus was removed (hysterectomy) and you have not had cancer.  You should be tested each year for STDs (sexually transmitted diseases), if you're at risk.   Talk to your provider about how often to have your cholesterol checked.  If you are at risk for diabetes, you should have a diabetes test (fasting glucose).  Shots: Get a flu shot each year. Get a tetanus shot every 10 years.    Nutrition:    Eat at least 5 servings of fruits and vegetables each day.  Eat whole-grain bread, whole-wheat pasta and brown rice, quinoa, faro, or barley instead of white grains and rice.  For bone health:  Eat calcium-rich foods or take calcium pills (500 to 600 mg) twice a day with food. Also take vitamin D (1000 IUs) each day.  Your total calcium intake should be 1000mg per day (3 servings of dairy daily or twice daily supplements)    Lifestyle  Exercise at least 150 minutes a week each week (30 minutes a day, 5 days a week). This will help you control your weight and prevent disease. If your goal is to lose weight you will need to increase the duration and intensity of your exercise regimen  Limit alcohol to one drink per day.  No smoking.    Wear sunscreen to prevent skin cancer.  See your dentist every six months for an exam and cleaning

## 2022-09-09 LAB
BKR LAB AP GYN ADEQUACY: NORMAL
BKR LAB AP GYN INTERPRETATION: NORMAL
BKR LAB AP HPV REFLEX: NORMAL
BKR LAB AP PREVIOUS ABNORMAL: NORMAL
PATH REPORT.COMMENTS IMP SPEC: NORMAL
PATH REPORT.COMMENTS IMP SPEC: NORMAL
PATH REPORT.RELEVANT HX SPEC: NORMAL

## 2022-09-13 LAB
HUMAN PAPILLOMA VIRUS 16 DNA: NEGATIVE
HUMAN PAPILLOMA VIRUS 18 DNA: NEGATIVE
HUMAN PAPILLOMA VIRUS FINAL DIAGNOSIS: ABNORMAL
HUMAN PAPILLOMA VIRUS OTHER HR: POSITIVE

## 2022-09-14 ENCOUNTER — PATIENT OUTREACH (OUTPATIENT)
Dept: OBGYN | Facility: CLINIC | Age: 37
End: 2022-09-14

## 2022-09-24 ENCOUNTER — HEALTH MAINTENANCE LETTER (OUTPATIENT)
Age: 37
End: 2022-09-24

## 2023-08-23 ENCOUNTER — PATIENT OUTREACH (OUTPATIENT)
Dept: OBGYN | Facility: CLINIC | Age: 38
End: 2023-08-23
Payer: COMMERCIAL

## 2023-08-23 DIAGNOSIS — R87.810 CERVICAL HIGH RISK HPV (HUMAN PAPILLOMAVIRUS) TEST POSITIVE: Primary | ICD-10-CM

## 2023-09-20 LAB — PAP-ABSTRACT: NORMAL

## 2023-09-23 ENCOUNTER — MYC MEDICAL ADVICE (OUTPATIENT)
Dept: OBGYN | Facility: CLINIC | Age: 38
End: 2023-09-23
Payer: COMMERCIAL

## 2023-10-04 NOTE — TELEPHONE ENCOUNTER
From: Sherry George   Sent: 10/4/2023   1:40 PM CDT   To: Dontae Nicolas Scheduling   Subject: RE: HPV                                            Topic: Non-Medical Question.      Hello,   I just wanted to give an update after speaking with Nurse Radha. After following up with my doctor here in [place], I was told only the pap smear was done and HPV was not.   It's quite costly to do HPV testing ($1500) and I don't have insurance here. I will have to test for HPV when I am back in [place] next year and update.

## 2023-10-14 ENCOUNTER — HEALTH MAINTENANCE LETTER (OUTPATIENT)
Age: 38
End: 2023-10-14

## 2023-11-06 ENCOUNTER — PATIENT OUTREACH (OUTPATIENT)
Dept: OBGYN | Facility: CLINIC | Age: 38
End: 2023-11-06
Payer: COMMERCIAL

## 2023-11-06 NOTE — TELEPHONE ENCOUNTER
9/20/23 NIL Pap, Neg HR HPV, patient reported. Done in [place].   11/6/23 Patient called RN by phone to convey pap results that were completed in [place]. Patient also states that she will [place] the [place] next [place] and would like to get reminders. Address has been verified in demographics.

## 2024-09-05 ENCOUNTER — PATIENT OUTREACH (OUTPATIENT)
Dept: OBGYN | Facility: CLINIC | Age: 39
End: 2024-09-05
Payer: COMMERCIAL

## 2024-09-05 DIAGNOSIS — R87.810 CERVICAL HIGH RISK HPV (HUMAN PAPILLOMAVIRUS) TEST POSITIVE: Primary | ICD-10-CM

## 2024-09-22 ENCOUNTER — HEALTH MAINTENANCE LETTER (OUTPATIENT)
Age: 39
End: 2024-09-22

## 2024-12-01 ENCOUNTER — HEALTH MAINTENANCE LETTER (OUTPATIENT)
Age: 39
End: 2024-12-01

## 2025-03-14 ENCOUNTER — ANCILLARY PROCEDURE (OUTPATIENT)
Dept: MAMMOGRAPHY | Facility: CLINIC | Age: 40
End: 2025-03-14
Attending: NURSE PRACTITIONER
Payer: COMMERCIAL

## 2025-03-14 DIAGNOSIS — Z12.31 VISIT FOR SCREENING MAMMOGRAM: ICD-10-CM

## 2025-03-14 PROBLEM — O00.80 OTHER ECTOPIC PREGNANCY WITHOUT INTRAUTERINE PREGNANCY: Status: RESOLVED | Noted: 2020-08-11 | Resolved: 2025-03-14

## 2025-03-14 PROBLEM — Z87.59 HISTORY OF ECTOPIC PREGNANCY: Status: RESOLVED | Noted: 2021-02-24 | Resolved: 2025-03-14

## 2025-03-14 PROBLEM — R87.810 CERVICAL HIGH RISK HPV (HUMAN PAPILLOMAVIRUS) TEST POSITIVE: Status: ACTIVE | Noted: 2022-09-14

## 2025-03-14 PROBLEM — O00.109 ECTOPIC PREGNANCY, TUBAL: Status: RESOLVED | Noted: 2020-07-30 | Resolved: 2025-03-14

## 2025-03-14 PROBLEM — O00.90 ECTOPIC PREGNANCY WITHOUT INTRAUTERINE PREGNANCY, UNSPECIFIED LOCATION: Status: RESOLVED | Noted: 2020-08-11 | Resolved: 2025-03-14

## 2025-03-14 PROBLEM — N80.9 ENDOMETRIOSIS: Status: ACTIVE | Noted: 2025-03-14

## 2025-03-14 PROBLEM — O00.109 TUBAL ECTOPIC PREGNANCY, UNSPECIFIED LATERALITY, UNSPECIFIED WHETHER INTRAUTERINE PREGNANCY PRESENT: Status: RESOLVED | Noted: 2020-08-04 | Resolved: 2025-03-14

## 2025-03-14 PROCEDURE — 77063 BREAST TOMOSYNTHESIS BI: CPT | Mod: TC | Performed by: RADIOLOGY

## 2025-03-14 PROCEDURE — 77067 SCR MAMMO BI INCL CAD: CPT | Mod: TC | Performed by: RADIOLOGY

## 2025-03-19 ENCOUNTER — MYC MEDICAL ADVICE (OUTPATIENT)
Dept: FAMILY MEDICINE | Facility: CLINIC | Age: 40
End: 2025-03-19
Payer: COMMERCIAL

## 2025-03-19 NOTE — TELEPHONE ENCOUNTER
See patient MyChart message.     RN replied via [employer].    Aleyda CARTER RN  [facility]

## 2025-03-26 ENCOUNTER — ANCILLARY ORDERS (OUTPATIENT)
Dept: MAMMOGRAPHY | Facility: CLINIC | Age: 40
End: 2025-03-26

## 2025-03-26 ENCOUNTER — HOSPITAL ENCOUNTER (OUTPATIENT)
Dept: MAMMOGRAPHY | Facility: CLINIC | Age: 40
Discharge: HOME | End: 2025-03-26
Attending: NURSE PRACTITIONER
Payer: COMMERCIAL

## 2025-03-26 DIAGNOSIS — R92.8 ABNORMAL MAMMOGRAM: Primary | ICD-10-CM

## 2025-03-26 DIAGNOSIS — R92.8 ABNORMAL MAMMOGRAM: ICD-10-CM

## 2025-03-26 PROCEDURE — 77065 DX MAMMO INCL CAD UNI: CPT | Mod: RT

## 2025-03-31 ENCOUNTER — MYC MEDICAL ADVICE (OUTPATIENT)
Dept: FAMILY MEDICINE | Facility: CLINIC | Age: 40
End: 2025-03-31
Payer: COMMERCIAL

## 2025-04-01 NOTE — TELEPHONE ENCOUNTER
[place]- see [email or web address] message below.  She had 3 previously and had one 3/14/25.  Do you want her to just not get any further hep b shots?  Reina العلي RN

## 2025-04-10 ENCOUNTER — HOSPITAL ENCOUNTER (OUTPATIENT)
Dept: MAMMOGRAPHY | Facility: CLINIC | Age: 40
Discharge: HOME | End: 2025-04-10
Attending: NURSE PRACTITIONER
Payer: COMMERCIAL

## 2025-04-10 DIAGNOSIS — R92.8 ABNORMAL MAMMOGRAM: ICD-10-CM

## 2025-04-10 PROCEDURE — 272N000715 MA STEREOTACTIC BREAST BIOPSY VACUUM RT

## 2025-04-10 PROCEDURE — 250N000011 HC RX IP 250 OP 636: Performed by: RADIOLOGY

## 2025-04-10 PROCEDURE — 250N000009 HC RX 250: Performed by: RADIOLOGY

## 2025-04-10 PROCEDURE — 96372 THER/PROPH/DIAG INJ SC/IM: CPT | Performed by: RADIOLOGY

## 2025-04-10 PROCEDURE — 999N000065 MA POST PROCEDURE RIGHT

## 2025-04-10 RX ORDER — LIDOCAINE HYDROCHLORIDE 10 MG/ML
10 INJECTION, SOLUTION EPIDURAL; INFILTRATION; INTRACAUDAL; PERINEURAL ONCE
Status: COMPLETED | OUTPATIENT
Start: 2025-04-10 | End: 2025-04-10

## 2025-04-10 RX ORDER — LIDOCAINE HYDROCHLORIDE AND EPINEPHRINE 10; 10 MG/ML; UG/ML
1 INJECTION, SOLUTION INFILTRATION; PERINEURAL ONCE
Status: COMPLETED | OUTPATIENT
Start: 2025-04-10 | End: 2025-04-10

## 2025-04-10 RX ADMIN — LIDOCAINE HYDROCHLORIDE 6 ML: 10 INJECTION, SOLUTION EPIDURAL; INFILTRATION; INTRACAUDAL; PERINEURAL at 10:20

## 2025-04-10 RX ADMIN — LIDOCAINE HYDROCHLORIDE,EPINEPHRINE BITARTRATE 12 ML: 10; .01 INJECTION, SOLUTION INFILTRATION; PERINEURAL at 10:20

## 2025-04-10 NOTE — DISCHARGE INSTRUCTIONS

## 2025-04-15 PROBLEM — D24.1 FIBROADENOMA OF RIGHT BREAST: Status: ACTIVE | Noted: 2025-04-15

## 2025-04-15 PROBLEM — N93.9 ABNORMAL UTERINE BLEEDING: Status: ACTIVE | Noted: 2025-04-15

## (undated) DEVICE — NDL SPINAL 22GA 7" QUINCKE 405149

## (undated) DEVICE — GLOVE PROTEXIS BLUE W/NEU-THERA 7.5  2D73EB75

## (undated) DEVICE — DAVINCI S NDL DRIVER LARGE 420006

## (undated) DEVICE — ESU GROUND PAD UNIVERSAL W/O CORD

## (undated) DEVICE — SU VICRYL 3-0 CT-1 36" J344H

## (undated) DEVICE — STABILIZER ARCH KOH-EFFICIENT 3.5CM KC-ARCH-35

## (undated) DEVICE — FILTER BERKLEY SAFETOUCH

## (undated) DEVICE — SOL NACL 0.9% IRRIG 1000ML BOTTLE 2F7124

## (undated) DEVICE — SU VICRYL 3-0 SH 27" J316H

## (undated) DEVICE — ENDO TROCAR FIRST ENTRY KII FIOS Z-THRD 11X100MM CTF33

## (undated) DEVICE — CATH INTERMITTENT CLEAN-CATH FEMALE 14FR 6" VINYL LF 420614

## (undated) DEVICE — GLOVE PROTEXIS W/NEU-THERA 7.0  2D73TE70

## (undated) DEVICE — SU CHROMIC 0 CT 27" 802H

## (undated) DEVICE — DECANTER TRANSFER DEVICE 2008S

## (undated) DEVICE — DEVICE SUTURE PASSER 14GA WECK EFX EFXSP2

## (undated) DEVICE — SU VICRYL 4-0 PS-2 18" UND J496H

## (undated) DEVICE — SU MONOCRYL 4-0 PS-2 18" UND Y496G

## (undated) DEVICE — SOL WATER IRRIG 1000ML BOTTLE 2F7114

## (undated) DEVICE — POSITIONER ARMBOARD FOAM 1PAIR LF FP-ARMB1

## (undated) DEVICE — LINEN FULL SHEET 5511

## (undated) DEVICE — KIT ABDOMINAL HYST SMA15AHFSM

## (undated) DEVICE — ADH SKIN CLOSURE PREMIERPRO EXOFIN 1.0ML 3470

## (undated) DEVICE — PAD PERI INDIV WRAP 11" 2022A

## (undated) DEVICE — SYR EAR BULB 3OZ 0035830

## (undated) DEVICE — DRAIN JACKSON PRATT 10MM FLAT 3/4 PERF

## (undated) DEVICE — SEAL SET MYOSURE ROD LENS SCOPE SINGLE USE 40-902

## (undated) DEVICE — ENDO POUCH UNIV RETRIEVAL SYSTEM INZII 10MM CD001

## (undated) DEVICE — SURGICEL POWDER ABSORBABLE HEMOSTAT 3GM 3013SP

## (undated) DEVICE — DRAPE WARMER 66X44" ORS-300

## (undated) DEVICE — CATH TRAY FOLEY SURESTEP 16FR WDRAIN BAG STLK LATEX A300316A

## (undated) DEVICE — DRAPE IOBAN INCISE 23X17" 6650EZ

## (undated) DEVICE — DAVINCI S GRASPER ENDOWRIST PROGRASP 420093

## (undated) DEVICE — PACK GYN LAPAROSCOPY RIDGES

## (undated) DEVICE — ENDO TROCAR SLEEVE KII ADV FIXATION 05X75MM CFS03

## (undated) DEVICE — DRAPE SLEEVE 599

## (undated) DEVICE — DAVINCI S CANNULA SEAL 8.5-13MM 420206

## (undated) DEVICE — DAVINCI SI DRAPE ACCESSORY KIT 3-ARM 420290

## (undated) DEVICE — ESU GROUND PAD ADULT W/CORD E7507

## (undated) DEVICE — DRSG STERI STRIP 1/2X4" R1547

## (undated) DEVICE — TUBING SUCTION VACUUM COLLECTION 6FT 610

## (undated) DEVICE — GLOVE PROTEXIS BLUE W/NEU-THERA 6.5  2D73EB65

## (undated) DEVICE — SPONGE BALL KERLIX ROUND XL W/O STRING LATEX 4935

## (undated) DEVICE — PAD CHUX UNDERPAD 30X36" P3036C

## (undated) DEVICE — NDL 22GA 1.5"

## (undated) DEVICE — SOL NACL 0.9% IRRIG 3000ML BAG 2B7477

## (undated) DEVICE — ENDO SCOPE WARMER LF TM500

## (undated) DEVICE — LINEN TOWEL PACK X5 5464

## (undated) DEVICE — SU VICRYL 0 UR-6 27" J603H

## (undated) DEVICE — DAVINCI HOT SHEARS TIP COVER  400180

## (undated) DEVICE — ESU HANDPIECE BIPOLAR THUNDERBEAT FC 5MMX35CM TB-0535FC

## (undated) DEVICE — ENDO TROCAR FIRST ENTRY KII FIOS ADV FIX 05X100MM CFF03

## (undated) DEVICE — NDL INSUFFLATION 13GA 120MM C2201

## (undated) DEVICE — ESU CORD BIPOLAR GREEN 10-4000

## (undated) DEVICE — LINEN ORTHO PACK 5446

## (undated) DEVICE — Device

## (undated) DEVICE — SOL NACL 0.9% INJ 1000ML BAG 2B1324X

## (undated) DEVICE — SUCTION CANISTER MEDIVAC LINER 3000ML W/LID 65651-530

## (undated) DEVICE — ADAPTER DRAPE ALLY AU-AD

## (undated) DEVICE — CATH TRAY FOLEY 16FR SIL

## (undated) DEVICE — DRAPE UNDER BUTTOCK 8483

## (undated) DEVICE — SU VICRYL 0 CT-1 27" J340H

## (undated) DEVICE — LINEN HALF SHEET 5512

## (undated) DEVICE — PACK MINOR LITHOTOMY RIDGES

## (undated) DEVICE — ESU CORD MONOPOLAR 10'  E0510

## (undated) DEVICE — KIT PROCEDURE FLUENT IN/OUT FLOWPAK TISS TRAP FLT-112S

## (undated) DEVICE — GLOVE PROTEXIS W/NEU-THERA 5.5  2D73TE55

## (undated) DEVICE — SU WND CLOSURE VLOC 180 ABS 2-0 12" GS-21 VLOCL0315

## (undated) DEVICE — PREP CHLORAPREP 26ML TINTED HI-LITE ORANGE 930815

## (undated) DEVICE — ENDO APPLICATOR SURGIFLO PLASMA COBLATION MS1995

## (undated) DEVICE — SU VICRYL 0 CT 36" J358H

## (undated) DEVICE — ENDO POUCH UNIVERSAL RETRIEVAL SYSTEM INZII 5MM CD003

## (undated) DEVICE — SU WND CLOSURE VLOC 180 ABS 0 9" GS-21 VLOCL0346

## (undated) DEVICE — ENDO TROCAR SLEEVE KII ADV FIXATION 05X100MM CFS02

## (undated) DEVICE — SUCTION IRR STRYKERFLOW II W/TIP 250-070-520

## (undated) DEVICE — COVER CAMERA IN-LIGHT DISP LT-C02

## (undated) DEVICE — DRSG TELFA ISLAND 4X10"

## (undated) DEVICE — BLADE KNIFE SURG 15C 371716

## (undated) DEVICE — ANTIFOG SOLUTION W/FOAM PAD 31142527

## (undated) DEVICE — SU MONOCRYL 4-0 PS-2 27" UND Y426H

## (undated) DEVICE — GLOVE PROTEXIS W/NEU-THERA 6.0  2D73TE60

## (undated) DEVICE — UTERINE MANIPULATOR RUMI 6.7MMX8CM UMB678

## (undated) DEVICE — DAVINCI S CANNULA SEAL 8MM 400077

## (undated) DEVICE — ESU HOLDER LAP INST DISP PURPLE LONG 330MM H-PRO-330

## (undated) DEVICE — TUBING C02 INSUFFLATION LAP FILTER HEATER 6198

## (undated) DEVICE — SU VICRYL 0 CT-1 3X27" J430T

## (undated) DEVICE — ESU HOLDER LAP INST DISP YELLOW SHORT 250MM H-PRO-250

## (undated) DEVICE — DRAPE MAYO STAND 23X54 8337

## (undated) DEVICE — BLADE CLIPPER 4406

## (undated) DEVICE — SYR 50ML CATH TIP W/O NDL 309620

## (undated) DEVICE — KIT POSITIONER PINK PAD XL ADVANCED 40588

## (undated) DEVICE — GLOVE PROTEXIS W/NEU-THERA 6.5  2D73TE65

## (undated) DEVICE — GLOVE PROTEXIS BLUE W/NEU-THERA 6.0  2D73EB60

## (undated) DEVICE — PREP DURAPREP 26ML APL 8630

## (undated) DEVICE — ENDO TROCAR FIRST ENTRY KII FIOS ADV FIX 05X75MM CFF05

## (undated) DEVICE — DAVINCI OBTURATOR 8MM BLADELESS 420023

## (undated) DEVICE — MORCELLATOR LAPAROSCOPIC LINA XCISE MOR-1515-6

## (undated) DEVICE — LINEN GOWN X4 5410

## (undated) DEVICE — BAG CLEAR TRASH 1.3M 39X33" P4040C

## (undated) DEVICE — SU CHROMIC 3-0 SH 27" G122H

## (undated) DEVICE — GLOVE PROTEXIS POWDER FREE SMT 6.5  2D72PT65X

## (undated) DEVICE — SPECIMEN TRAP VACUUM SUCTION 003984-901

## (undated) DEVICE — DAVINCI S NDL DRIVER MEGA SUTURE CUT 420309

## (undated) DEVICE — ESU HANDPIECE THUNDERBEAT FRONT ACT 5MMX35CM TB-0535FCS

## (undated) DEVICE — APPLICATOR ENDOSCOPIC 5 SURGICEL POWDER 3123SPEA

## (undated) DEVICE — DAVINCI S FCP BIPOLAR FENESTRATED 420205

## (undated) DEVICE — SUCTION MANIFOLD NEPTUNE 2 SYS 4 PORT 0702-020-000

## (undated) RX ORDER — OXYCODONE HYDROCHLORIDE 5 MG/1
TABLET ORAL
Status: DISPENSED
Start: 2022-04-20

## (undated) RX ORDER — ACETAMINOPHEN 325 MG/1
TABLET ORAL
Status: DISPENSED
Start: 2022-04-20

## (undated) RX ORDER — ONDANSETRON 2 MG/ML
INJECTION INTRAMUSCULAR; INTRAVENOUS
Status: DISPENSED
Start: 2021-03-19

## (undated) RX ORDER — FENTANYL CITRATE 50 UG/ML
INJECTION, SOLUTION INTRAMUSCULAR; INTRAVENOUS
Status: DISPENSED
Start: 2022-04-20

## (undated) RX ORDER — FENTANYL CITRATE 50 UG/ML
INJECTION, SOLUTION INTRAMUSCULAR; INTRAVENOUS
Status: DISPENSED
Start: 2021-03-19

## (undated) RX ORDER — PROPOFOL 10 MG/ML
INJECTION, EMULSION INTRAVENOUS
Status: DISPENSED
Start: 2021-03-19

## (undated) RX ORDER — FENTANYL CITRATE 50 UG/ML
INJECTION, SOLUTION INTRAMUSCULAR; INTRAVENOUS
Status: DISPENSED
Start: 2020-07-29

## (undated) RX ORDER — ONDANSETRON 2 MG/ML
INJECTION INTRAMUSCULAR; INTRAVENOUS
Status: DISPENSED
Start: 2022-04-20

## (undated) RX ORDER — DOXYCYCLINE 100 MG/1
CAPSULE ORAL
Status: DISPENSED
Start: 2022-04-20

## (undated) RX ORDER — CEFAZOLIN SODIUM 1 G/3ML
INJECTION, POWDER, FOR SOLUTION INTRAMUSCULAR; INTRAVENOUS
Status: DISPENSED
Start: 2022-04-20

## (undated) RX ORDER — LABETALOL 20 MG/4 ML (5 MG/ML) INTRAVENOUS SYRINGE
Status: DISPENSED
Start: 2022-04-20

## (undated) RX ORDER — CEFAZOLIN SODIUM 2 G/100ML
INJECTION, SOLUTION INTRAVENOUS
Status: DISPENSED
Start: 2021-03-19

## (undated) RX ORDER — HYDROMORPHONE HYDROCHLORIDE 1 MG/ML
INJECTION, SOLUTION INTRAMUSCULAR; INTRAVENOUS; SUBCUTANEOUS
Status: DISPENSED
Start: 2021-03-19

## (undated) RX ORDER — DEXAMETHASONE SODIUM PHOSPHATE 4 MG/ML
INJECTION, SOLUTION INTRA-ARTICULAR; INTRALESIONAL; INTRAMUSCULAR; INTRAVENOUS; SOFT TISSUE
Status: DISPENSED
Start: 2020-07-29

## (undated) RX ORDER — VASOPRESSIN 20 U/ML
INJECTION PARENTERAL
Status: DISPENSED
Start: 2020-07-29

## (undated) RX ORDER — ACETAMINOPHEN 325 MG/1
TABLET ORAL
Status: DISPENSED
Start: 2021-03-19

## (undated) RX ORDER — DEXAMETHASONE SODIUM PHOSPHATE 4 MG/ML
INJECTION, SOLUTION INTRA-ARTICULAR; INTRALESIONAL; INTRAMUSCULAR; INTRAVENOUS; SOFT TISSUE
Status: DISPENSED
Start: 2021-03-19

## (undated) RX ORDER — PROPOFOL 10 MG/ML
INJECTION, EMULSION INTRAVENOUS
Status: DISPENSED
Start: 2022-04-20

## (undated) RX ORDER — DEXAMETHASONE SODIUM PHOSPHATE 4 MG/ML
INJECTION, SOLUTION INTRA-ARTICULAR; INTRALESIONAL; INTRAMUSCULAR; INTRAVENOUS; SOFT TISSUE
Status: DISPENSED
Start: 2022-04-20

## (undated) RX ORDER — BUPIVACAINE HYDROCHLORIDE AND EPINEPHRINE 2.5; 5 MG/ML; UG/ML
INJECTION, SOLUTION EPIDURAL; INFILTRATION; INTRACAUDAL; PERINEURAL
Status: DISPENSED
Start: 2020-07-29

## (undated) RX ORDER — ONDANSETRON 2 MG/ML
INJECTION INTRAMUSCULAR; INTRAVENOUS
Status: DISPENSED
Start: 2020-07-29

## (undated) RX ORDER — GLYCOPYRROLATE 0.2 MG/ML
INJECTION INTRAMUSCULAR; INTRAVENOUS
Status: DISPENSED
Start: 2021-03-19

## (undated) RX ORDER — LIDOCAINE HYDROCHLORIDE 20 MG/ML
INJECTION, SOLUTION EPIDURAL; INFILTRATION; INTRACAUDAL; PERINEURAL
Status: DISPENSED
Start: 2021-03-19

## (undated) RX ORDER — HYDROMORPHONE HYDROCHLORIDE 1 MG/ML
INJECTION, SOLUTION INTRAMUSCULAR; INTRAVENOUS; SUBCUTANEOUS
Status: DISPENSED
Start: 2020-07-29

## (undated) RX ORDER — LIDOCAINE HYDROCHLORIDE 10 MG/ML
INJECTION, SOLUTION EPIDURAL; INFILTRATION; INTRACAUDAL; PERINEURAL
Status: DISPENSED
Start: 2022-04-20

## (undated) RX ORDER — CEFAZOLIN SODIUM 1 G/3ML
INJECTION, POWDER, FOR SOLUTION INTRAMUSCULAR; INTRAVENOUS
Status: DISPENSED
Start: 2021-03-19

## (undated) RX ORDER — FENTANYL CITRATE-0.9 % NACL/PF 10 MCG/ML
PLASTIC BAG, INJECTION (ML) INTRAVENOUS
Status: DISPENSED
Start: 2022-04-20

## (undated) RX ORDER — GLYCOPYRROLATE 0.2 MG/ML
INJECTION INTRAMUSCULAR; INTRAVENOUS
Status: DISPENSED
Start: 2022-04-20

## (undated) RX ORDER — PROPOFOL 10 MG/ML
INJECTION, EMULSION INTRAVENOUS
Status: DISPENSED
Start: 2020-07-29

## (undated) RX ORDER — PROPOFOL 10 MG/ML
INJECTION, EMULSION INTRAVENOUS
Status: DISPENSED
Start: 2020-07-30

## (undated) RX ORDER — KETOROLAC TROMETHAMINE 30 MG/ML
INJECTION, SOLUTION INTRAMUSCULAR; INTRAVENOUS
Status: DISPENSED
Start: 2020-07-29